# Patient Record
Sex: FEMALE | Race: OTHER | HISPANIC OR LATINO | URBAN - METROPOLITAN AREA
[De-identification: names, ages, dates, MRNs, and addresses within clinical notes are randomized per-mention and may not be internally consistent; named-entity substitution may affect disease eponyms.]

---

## 2017-01-31 ENCOUNTER — EMERGENCY (EMERGENCY)
Facility: HOSPITAL | Age: 67
LOS: 1 days | Discharge: HOME | End: 2017-01-31
Attending: EMERGENCY MEDICINE
Payer: MEDICARE

## 2017-01-31 VITALS
HEART RATE: 85 BPM | TEMPERATURE: 99 F | WEIGHT: 130.07 LBS | RESPIRATION RATE: 18 BRPM | OXYGEN SATURATION: 96 % | DIASTOLIC BLOOD PRESSURE: 80 MMHG | HEIGHT: 60 IN | SYSTOLIC BLOOD PRESSURE: 154 MMHG

## 2017-01-31 VITALS — HEIGHT: 60 IN | WEIGHT: 130.07 LBS
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
## 2017-01-31 DIAGNOSIS — S61.551A OPEN BITE OF RIGHT WRIST, INITIAL ENCOUNTER: ICD-10-CM

## 2017-01-31 DIAGNOSIS — Z23 ENCOUNTER FOR IMMUNIZATION: ICD-10-CM

## 2017-01-31 DIAGNOSIS — E78.5 HYPERLIPIDEMIA, UNSPECIFIED: ICD-10-CM

## 2017-01-31 DIAGNOSIS — I10 ESSENTIAL (PRIMARY) HYPERTENSION: ICD-10-CM
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
## 2017-01-31 PROCEDURE — 12002 RPR S/N/AX/GEN/TRNK2.6-7.5CM: CPT

## 2017-01-31 PROCEDURE — 90471 IMMUNIZATION ADMIN: CPT

## 2017-01-31 PROCEDURE — 99283 EMERGENCY DEPT VISIT LOW MDM: CPT | Mod: 25

## 2017-01-31 PROCEDURE — 90715 TDAP VACCINE 7 YRS/> IM: CPT

## 2017-01-31 RX ORDER — TETANUS TOXOID, REDUCED DIPHTHERIA TOXOID AND ACELLULAR PERTUSSIS VACCINE, ADSORBED 5; 2.5; 8; 8; 2.5 [IU]/.5ML; [IU]/.5ML; UG/.5ML; UG/.5ML; UG/.5ML
0.5 SUSPENSION INTRAMUSCULAR ONCE
Qty: 0 | Refills: 0 | Status: COMPLETED | OUTPATIENT
Start: 2017-01-31 | End: 2017-01-31

## 2017-01-31 RX ADMIN — TETANUS TOXOID, REDUCED DIPHTHERIA TOXOID AND ACELLULAR PERTUSSIS VACCINE, ADSORBED 0.5 MILLILITER(S): 5; 2.5; 8; 8; 2.5 SUSPENSION INTRAMUSCULAR at 14:18

## 2017-01-31 RX ADMIN — Medication 1 TABLET(S): at 14:17

## 2017-01-31 NOTE — ED ADULT NURSE NOTE - OBJECTIVE STATEMENT
Pt presented to ED c/o R FA dog bite , dog UTD with vaccines , injury happens 2 hrs PTA , no bleeding upon arrival

## 2017-01-31 NOTE — ED STATDOCS - ATTENDING CONTRIBUTION TO CARE
I, Enoc Winchester, performed the initial face to face bedside interview with this patient regarding history of present illness, review of symptoms and relevant past medical, social and family history.  I completed an independent physical examination.  I was the initial provider who evaluated this patient. I have signed out the follow up of any pending tests (i.e. labs, radiological studies) to the ACP.  I have communicated the patient’s plan of care and disposition with the ACP.  The history, relevant review of systems, past medical and surgical history, medical decision making, and physical examination was documented by the scribe in my presence and I attest to the accuracy of the documentation.

## 2017-01-31 NOTE — ED STATDOCS - SKIN, MLM
3cm elliptical animal bite to volar aspect of right wrist, deeper at distal aspect. No active bleeding.

## 2017-01-31 NOTE — ED STATDOCS - OBJECTIVE STATEMENT
68 y/o female presents to ED c/o dog bite to right wrist sustained 2 hours ago. Pt states that the injury was inflicted by her daughter's dog, immunizations UTD. Pt reports that she attempted to pet the dog when he bit her. She states that this dog has bitten her family members multiple times in the past and her daughter is bringing the dog to be watched in [place]. PMHx = HTN and HLD. No h/o DM. No further complaints at this time. NKDA. ?if tetanus is UTD. PMD Dr. Garcia.

## 2017-01-31 NOTE — ED STATDOCS - PROGRESS NOTE DETAILS
NP NOTE:  68 y/o F bit by daughters dog today on right wrist.  She has a 3cm elliptical laceration to volar aspect of wrist, full ROM of wrist and hand, sensation intact.  Will rx augmentin 875 x 10 days, f/u with Dr. Wren.   Area cleaned and irrigated thoroughly, sutures placed using sterile technique, return to ED 7 days for suture removal.

## 2017-01-31 NOTE — ED PROCEDURE NOTE - CPROC ED POST PROC CARE GUIDE1
Verbal/written post procedure instructions were given to patient/caregiver./Instructed patient/caregiver to follow-up with primary care physician./Instructed patient/caregiver regarding signs and symptoms of infection./Keep the cast/splint/dressing clean and dry.

## 2017-01-31 NOTE — ED STATDOCS - NS ED MD SCRIBE ATTENDING SCRIBE SECTIONS
REVIEW OF SYSTEMS/PHYSICAL EXAM/PAST MEDICAL/SURGICAL/SOCIAL HISTORY/HISTORY OF PRESENT ILLNESS/DISPOSITION/VITAL SIGNS( Pullset)

## 2017-10-03 ENCOUNTER — RESULT REVIEW (OUTPATIENT)
Age: 67
End: 2017-10-03

## 2018-03-09 ENCOUNTER — INPATIENT (INPATIENT)
Facility: HOSPITAL | Age: 68
LOS: 2 days | Discharge: HOME | DRG: 70 | End: 2018-03-12
Attending: INTERNAL MEDICINE | Admitting: HOSPITALIST
Payer: MEDICARE

## 2018-03-09 VITALS
SYSTOLIC BLOOD PRESSURE: 96 MMHG | RESPIRATION RATE: 18 BRPM | TEMPERATURE: 102 F | WEIGHT: 138.01 LBS | HEART RATE: 103 BPM | DIASTOLIC BLOOD PRESSURE: 64 MMHG | OXYGEN SATURATION: 95 %

## 2018-03-09 DIAGNOSIS — Z98.890 OTHER SPECIFIED POSTPROCEDURAL STATES: Chronic | ICD-10-CM

## 2018-03-09 DIAGNOSIS — A41.9 SEPSIS, UNSPECIFIED ORGANISM: ICD-10-CM

## 2018-03-09 LAB
ALBUMIN SERPL ELPH-MCNC: 3.4 G/DL — SIGNIFICANT CHANGE UP (ref 3.3–5.2)
ALP SERPL-CCNC: 72 U/L — SIGNIFICANT CHANGE UP (ref 40–120)
ALT FLD-CCNC: 21 U/L — SIGNIFICANT CHANGE UP
ANION GAP SERPL CALC-SCNC: 15 MMOL/L — SIGNIFICANT CHANGE UP (ref 5–17)
ANISOCYTOSIS BLD QL: SLIGHT — SIGNIFICANT CHANGE UP
APPEARANCE UR: CLEAR — SIGNIFICANT CHANGE UP
APTT BLD: 24.5 SEC — LOW (ref 27.5–37.4)
AST SERPL-CCNC: 33 U/L — HIGH
BILIRUB SERPL-MCNC: 0.4 MG/DL — SIGNIFICANT CHANGE UP (ref 0.4–2)
BILIRUB UR-MCNC: NEGATIVE — SIGNIFICANT CHANGE UP
BUN SERPL-MCNC: 21 MG/DL — HIGH (ref 8–20)
CALCIUM SERPL-MCNC: 9.2 MG/DL — SIGNIFICANT CHANGE UP (ref 8.6–10.2)
CHLORIDE SERPL-SCNC: 97 MMOL/L — LOW (ref 98–107)
CO2 SERPL-SCNC: 24 MMOL/L — SIGNIFICANT CHANGE UP (ref 22–29)
COLOR SPEC: YELLOW — SIGNIFICANT CHANGE UP
CREAT SERPL-MCNC: 0.76 MG/DL — SIGNIFICANT CHANGE UP (ref 0.5–1.3)
DIFF PNL FLD: NEGATIVE — SIGNIFICANT CHANGE UP
EPI CELLS # UR: SIGNIFICANT CHANGE UP
GLUCOSE SERPL-MCNC: 111 MG/DL — SIGNIFICANT CHANGE UP (ref 70–115)
GLUCOSE UR QL: NEGATIVE MG/DL — SIGNIFICANT CHANGE UP
HCT VFR BLD CALC: 31.6 % — LOW (ref 37–47)
HGB BLD-MCNC: 9.5 G/DL — LOW (ref 12–16)
INR BLD: 1.07 RATIO — SIGNIFICANT CHANGE UP (ref 0.88–1.16)
KETONES UR-MCNC: NEGATIVE — SIGNIFICANT CHANGE UP
LACTATE BLDV-MCNC: 2.4 MMOL/L — HIGH (ref 0.5–2)
LACTATE BLDV-MCNC: 2.5 MMOL/L — HIGH (ref 0.5–2)
LEUKOCYTE ESTERASE UR-ACNC: ABNORMAL
LYMPHOCYTES # BLD AUTO: 5 % — LOW (ref 20–55)
MCHC RBC-ENTMCNC: 25.2 PG — LOW (ref 27–31)
MCHC RBC-ENTMCNC: 30.1 G/DL — LOW (ref 32–36)
MCV RBC AUTO: 83.8 FL — SIGNIFICANT CHANGE UP (ref 81–99)
MONOCYTES NFR BLD AUTO: 2 % — LOW (ref 3–10)
NEUTROPHILS NFR BLD AUTO: 93 % — HIGH (ref 37–73)
NITRITE UR-MCNC: NEGATIVE — SIGNIFICANT CHANGE UP
PH UR: 6 — SIGNIFICANT CHANGE UP (ref 5–8)
PLAT MORPH BLD: NORMAL — SIGNIFICANT CHANGE UP
PLATELET # BLD AUTO: 439 K/UL — HIGH (ref 150–400)
POTASSIUM SERPL-MCNC: 4.2 MMOL/L — SIGNIFICANT CHANGE UP (ref 3.5–5.3)
POTASSIUM SERPL-SCNC: 4.2 MMOL/L — SIGNIFICANT CHANGE UP (ref 3.5–5.3)
PROT SERPL-MCNC: 6.1 G/DL — LOW (ref 6.6–8.7)
PROT UR-MCNC: NEGATIVE MG/DL — SIGNIFICANT CHANGE UP
PROTHROM AB SERPL-ACNC: 11.8 SEC — SIGNIFICANT CHANGE UP (ref 9.8–12.7)
RBC # BLD: 3.77 M/UL — LOW (ref 4.4–5.2)
RBC # FLD: 19.8 % — HIGH (ref 11–15.6)
RBC BLD AUTO: PRESENT — SIGNIFICANT CHANGE UP
RBC CASTS # UR COMP ASSIST: NEGATIVE /HPF — SIGNIFICANT CHANGE UP (ref 0–4)
SODIUM SERPL-SCNC: 136 MMOL/L — SIGNIFICANT CHANGE UP (ref 135–145)
SP GR SPEC: 1.01 — SIGNIFICANT CHANGE UP (ref 1.01–1.02)
UROBILINOGEN FLD QL: NEGATIVE MG/DL — SIGNIFICANT CHANGE UP
WBC # BLD: 17.8 K/UL — HIGH (ref 4.8–10.8)
WBC # FLD AUTO: 17.8 K/UL — HIGH (ref 4.8–10.8)
WBC UR QL: ABNORMAL

## 2018-03-09 PROCEDURE — 93010 ELECTROCARDIOGRAM REPORT: CPT

## 2018-03-09 PROCEDURE — 99291 CRITICAL CARE FIRST HOUR: CPT

## 2018-03-09 PROCEDURE — 99497 ADVNCD CARE PLAN 30 MIN: CPT | Mod: 25

## 2018-03-09 PROCEDURE — 71045 X-RAY EXAM CHEST 1 VIEW: CPT | Mod: 26

## 2018-03-09 PROCEDURE — 99223 1ST HOSP IP/OBS HIGH 75: CPT

## 2018-03-09 RX ORDER — SODIUM CHLORIDE 9 MG/ML
2000 INJECTION INTRAMUSCULAR; INTRAVENOUS; SUBCUTANEOUS ONCE
Qty: 0 | Refills: 0 | Status: COMPLETED | OUTPATIENT
Start: 2018-03-09 | End: 2018-03-09

## 2018-03-09 RX ORDER — SIMVASTATIN 20 MG/1
20 TABLET, FILM COATED ORAL AT BEDTIME
Qty: 0 | Refills: 0 | Status: DISCONTINUED | OUTPATIENT
Start: 2018-03-09 | End: 2018-03-12

## 2018-03-09 RX ORDER — ACETAMINOPHEN 500 MG
650 TABLET ORAL EVERY 6 HOURS
Qty: 0 | Refills: 0 | Status: DISCONTINUED | OUTPATIENT
Start: 2018-03-09 | End: 2018-03-12

## 2018-03-09 RX ORDER — ALBUTEROL 90 UG/1
2.5 AEROSOL, METERED ORAL EVERY 6 HOURS
Qty: 0 | Refills: 0 | Status: DISCONTINUED | OUTPATIENT
Start: 2018-03-09 | End: 2018-03-12

## 2018-03-09 RX ORDER — SODIUM CHLORIDE 9 MG/ML
3 INJECTION INTRAMUSCULAR; INTRAVENOUS; SUBCUTANEOUS ONCE
Qty: 0 | Refills: 0 | Status: COMPLETED | OUTPATIENT
Start: 2018-03-09 | End: 2018-03-09

## 2018-03-09 RX ORDER — VANCOMYCIN HCL 1 G
1000 VIAL (EA) INTRAVENOUS ONCE
Qty: 0 | Refills: 0 | Status: COMPLETED | OUTPATIENT
Start: 2018-03-09 | End: 2018-03-09

## 2018-03-09 RX ORDER — IBUPROFEN 200 MG
600 TABLET ORAL ONCE
Qty: 0 | Refills: 0 | Status: COMPLETED | OUTPATIENT
Start: 2018-03-09 | End: 2018-03-09

## 2018-03-09 RX ORDER — SODIUM CHLORIDE 9 MG/ML
1000 INJECTION, SOLUTION INTRAVENOUS
Qty: 0 | Refills: 0 | Status: DISCONTINUED | OUTPATIENT
Start: 2018-03-09 | End: 2018-03-10

## 2018-03-09 RX ORDER — IPRATROPIUM/ALBUTEROL SULFATE 18-103MCG
3 AEROSOL WITH ADAPTER (GRAM) INHALATION EVERY 6 HOURS
Qty: 0 | Refills: 0 | Status: DISCONTINUED | OUTPATIENT
Start: 2018-03-09 | End: 2018-03-09

## 2018-03-09 RX ORDER — ONDANSETRON 8 MG/1
4 TABLET, FILM COATED ORAL EVERY 6 HOURS
Qty: 0 | Refills: 0 | Status: DISCONTINUED | OUTPATIENT
Start: 2018-03-09 | End: 2018-03-12

## 2018-03-09 RX ORDER — METOPROLOL TARTRATE 50 MG
50 TABLET ORAL DAILY
Qty: 0 | Refills: 0 | Status: DISCONTINUED | OUTPATIENT
Start: 2018-03-09 | End: 2018-03-12

## 2018-03-09 RX ORDER — ACETAMINOPHEN 500 MG
1000 TABLET ORAL ONCE
Qty: 0 | Refills: 0 | Status: DISCONTINUED | OUTPATIENT
Start: 2018-03-09 | End: 2018-03-12

## 2018-03-09 RX ORDER — SIMVASTATIN 20 MG/1
0 TABLET, FILM COATED ORAL
Qty: 0 | Refills: 0 | COMMUNITY

## 2018-03-09 RX ORDER — LORATADINE 10 MG/1
10 TABLET ORAL DAILY
Qty: 0 | Refills: 0 | Status: DISCONTINUED | OUTPATIENT
Start: 2018-03-09 | End: 2018-03-12

## 2018-03-09 RX ORDER — CEFEPIME 1 G/1
2000 INJECTION, POWDER, FOR SOLUTION INTRAMUSCULAR; INTRAVENOUS ONCE
Qty: 0 | Refills: 0 | Status: COMPLETED | OUTPATIENT
Start: 2018-03-09 | End: 2018-03-09

## 2018-03-09 RX ORDER — AMLODIPINE BESYLATE 2.5 MG/1
5 TABLET ORAL DAILY
Qty: 0 | Refills: 0 | Status: DISCONTINUED | OUTPATIENT
Start: 2018-03-09 | End: 2018-03-12

## 2018-03-09 RX ORDER — SODIUM CHLORIDE 9 MG/ML
2000 INJECTION, SOLUTION INTRAVENOUS ONCE
Qty: 0 | Refills: 0 | Status: COMPLETED | OUTPATIENT
Start: 2018-03-09 | End: 2018-03-09

## 2018-03-09 RX ADMIN — Medication 250 MILLIGRAM(S): at 20:27

## 2018-03-09 RX ADMIN — SODIUM CHLORIDE 2000 MILLILITER(S): 9 INJECTION, SOLUTION INTRAVENOUS at 20:15

## 2018-03-09 RX ADMIN — SODIUM CHLORIDE 3 MILLILITER(S): 9 INJECTION INTRAMUSCULAR; INTRAVENOUS; SUBCUTANEOUS at 20:46

## 2018-03-09 RX ADMIN — Medication 600 MILLIGRAM(S): at 20:46

## 2018-03-09 RX ADMIN — SODIUM CHLORIDE 2000 MILLILITER(S): 9 INJECTION INTRAMUSCULAR; INTRAVENOUS; SUBCUTANEOUS at 23:10

## 2018-03-09 NOTE — ED PROVIDER NOTE - CARE PLAN
Principal Discharge DX:	Sepsis  Secondary Diagnosis:	Breast cancer  Secondary Diagnosis:	H/O mastectomy, bilateral

## 2018-03-09 NOTE — H&P ADULT - HISTORY OF PRESENT ILLNESS
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 Pt now back to baseline and states that yesterday she began to feel ill after work, noting that her face was flushed (states this happens with chemo). Pt states she always has a cough, and that it is at baseline. Pt denies n/v/d, dysuria, hematuria, HA, palpitations, chest pain. No sick contacts at home however + at school.

## 2018-03-09 NOTE — ED PROVIDER NOTE - MEDICAL DECISION MAKING DETAILS
69 y/o F, with breast cancer, s/p chemotherapy 2 days ago, presents with fever of unknown source,  will do blood test, IV fluids and abx, and reeval

## 2018-03-09 NOTE — H&P ADULT - ATTENDING COMMENTS
30 min time spent discussing advanced care planning including code status, existence of health care proxy, plan of treatment, consultants if called, and prognosis. Family and pt in agreement with above, all questions answered and concerns addressed.

## 2018-03-09 NOTE — H&P ADULT - PROBLEM SELECTOR PLAN 1
concerning for infectious or neurological etiology  Urinalysis and culture  Blood cultures in setting of infection  Fall precautions  RVP  legionella antigen  nebs prn  tylenol prn fever/pain  sputum culture  lactate 2.4 on presentation, s/p 2L up to 2.5, additional IVF being given now and will repeat   Procalcitonin 0.06  CT chest/head as pt p/w confusion and weakness however s/s more consistent with infection given presentation without focal neuro deficits and returned to baseline s/p IVF and abx. CT chest to evaluate for pneumonia as pt with mets to lungs and bone and unable to truly differentiate on chest xray. concerning for infectious or neurological etiology  Urinalysis and culture  Blood cultures in setting of infection  Fall precautions  RVP  legionella antigen  nebs prn  tylenol prn fever/pain  sputum culture  lactate 2.4 on presentation, s/p 2L up to 2.5, additional IVF being given now and will repeat   Procalcitonin 0.06  CT chest/head as pt p/w confusion and weakness however s/s more consistent with infection given presentation without focal neuro deficits and returned to baseline s/p IVF and abx. CT chest to evaluate for pneumonia and CT head ordered as pt with mets to lungs and bone and unable to truly differentiate pneumonia vs mets on chest xray and possibility of brain mets.

## 2018-03-09 NOTE — ED PROVIDER NOTE - OBJECTIVE STATEMENT
69 y/o F, with hx of HTN, breast cancer, and a double mastectomy, presents to the ED c/o fever, onset today.  Pt states that she had a fever of 103F this morning and self medicated with Tylenol.  After taking medicine her fever dropped to 101F.  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  Pt has been receiving chemotherapy since October.  Last chemo session was 2 days ago.  Denies body aches, sore throat, cough, rash, or burning urination.  Pt also states that earlier today she felt dizzy.  States she was diagnosed with breast cancer 3 years ago, but recently it spread to her lungs, liver, part of her kidney, and she had a lesion on her ribs.  On last PET scan, pt was told that 95% of her lesions were no longer visible.  Denies CP, SOB, N/V/D, or back pain.  Oncologist: Dr. Amparo Gill. Code sepsis activated.

## 2018-03-09 NOTE — H&P ADULT - PROBLEM SELECTOR PROBLEM 5
Bilateral malignant neoplasm of breast in female, unspecified estrogen receptor status, unspecified site of breast

## 2018-03-09 NOTE — ED ADULT TRIAGE NOTE - CHIEF COMPLAINT QUOTE
patient jackelyn from home with a fever, had chemo wednesday has not been eating or drinking anything since, patient also has complaints of feeling weak. patient states that she has some difficulty breathing. patient last took tylenol approx 45 minutes prior to arrival to ED

## 2018-03-09 NOTE — H&P ADULT - FAMILY HISTORY
Mother  Still living? Unknown  Maternal family history of cancer, Age at diagnosis: Age Unknown     Sibling  Still living? Unknown  Maternal family history of cancer, Age at diagnosis: Age Unknown

## 2018-03-09 NOTE — H&P ADULT - ASSESSMENT
67 yo female with active metastatic breast cancer presents with confusion, weakness, and fever meeting SIRS criteria and concerning for sepsis however no identifiable source found at this time. CT chest pending.

## 2018-03-10 ENCOUNTER — TRANSCRIPTION ENCOUNTER (OUTPATIENT)
Age: 68
End: 2018-03-10

## 2018-03-10 DIAGNOSIS — Z98.82 BREAST IMPLANT STATUS: Chronic | ICD-10-CM

## 2018-03-10 DIAGNOSIS — A41.9 SEPSIS, UNSPECIFIED ORGANISM: ICD-10-CM

## 2018-03-10 DIAGNOSIS — D63.0 ANEMIA IN NEOPLASTIC DISEASE: ICD-10-CM

## 2018-03-10 DIAGNOSIS — G93.41 METABOLIC ENCEPHALOPATHY: ICD-10-CM

## 2018-03-10 DIAGNOSIS — Z79.899 OTHER LONG TERM (CURRENT) DRUG THERAPY: ICD-10-CM

## 2018-03-10 DIAGNOSIS — N17.9 ACUTE KIDNEY FAILURE, UNSPECIFIED: ICD-10-CM

## 2018-03-10 DIAGNOSIS — I10 ESSENTIAL (PRIMARY) HYPERTENSION: ICD-10-CM

## 2018-03-10 DIAGNOSIS — C50.911 MALIGNANT NEOPLASM OF UNSPECIFIED SITE OF RIGHT FEMALE BREAST: ICD-10-CM

## 2018-03-10 DIAGNOSIS — E78.5 HYPERLIPIDEMIA, UNSPECIFIED: ICD-10-CM

## 2018-03-10 LAB
ALBUMIN SERPL ELPH-MCNC: 3 G/DL — LOW (ref 3.3–5.2)
ALP SERPL-CCNC: 66 U/L — SIGNIFICANT CHANGE UP (ref 40–120)
ALT FLD-CCNC: 16 U/L — SIGNIFICANT CHANGE UP
ANION GAP SERPL CALC-SCNC: 14 MMOL/L — SIGNIFICANT CHANGE UP (ref 5–17)
ANISOCYTOSIS BLD QL: SLIGHT — SIGNIFICANT CHANGE UP
APTT BLD: 23.6 SEC — LOW (ref 27.5–37.4)
AST SERPL-CCNC: 23 U/L — SIGNIFICANT CHANGE UP
BASOPHILS # BLD AUTO: 0 K/UL — SIGNIFICANT CHANGE UP (ref 0–0.2)
BASOPHILS NFR BLD AUTO: 0.1 % — SIGNIFICANT CHANGE UP (ref 0–2)
BILIRUB SERPL-MCNC: 0.5 MG/DL — SIGNIFICANT CHANGE UP (ref 0.4–2)
BUN SERPL-MCNC: 15 MG/DL — SIGNIFICANT CHANGE UP (ref 8–20)
CALCIUM SERPL-MCNC: 8.7 MG/DL — SIGNIFICANT CHANGE UP (ref 8.6–10.2)
CHLORIDE SERPL-SCNC: 104 MMOL/L — SIGNIFICANT CHANGE UP (ref 98–107)
CHOLEST SERPL-MCNC: 144 MG/DL — SIGNIFICANT CHANGE UP (ref 110–199)
CO2 SERPL-SCNC: 21 MMOL/L — LOW (ref 22–29)
CREAT SERPL-MCNC: 0.55 MG/DL — SIGNIFICANT CHANGE UP (ref 0.5–1.3)
EOSINOPHIL # BLD AUTO: 0 K/UL — SIGNIFICANT CHANGE UP (ref 0–0.5)
EOSINOPHIL NFR BLD AUTO: 0.1 % — SIGNIFICANT CHANGE UP (ref 0–5)
ESTIMATED AVERAGE GLUCOSE: 108 MG/DL — SIGNIFICANT CHANGE UP (ref 68–114)
GLUCOSE SERPL-MCNC: 140 MG/DL — HIGH (ref 70–115)
HBA1C BLD-MCNC: 5.4 % — SIGNIFICANT CHANGE UP (ref 4–5.6)
HBA1C BLD-MCNC: 5.5 % — SIGNIFICANT CHANGE UP (ref 4–5.6)
HCT VFR BLD CALC: 28.9 % — LOW (ref 37–47)
HDLC SERPL-MCNC: 34 MG/DL — LOW
HGB BLD-MCNC: 8.5 G/DL — LOW (ref 12–16)
HYPOCHROMIA BLD QL: SLIGHT — SIGNIFICANT CHANGE UP
INR BLD: 1.09 RATIO — SIGNIFICANT CHANGE UP (ref 0.88–1.16)
LACTATE BLDV-MCNC: 2.1 MMOL/L — HIGH (ref 0.5–2)
LIPID PNL WITH DIRECT LDL SERPL: 65 MG/DL — SIGNIFICANT CHANGE UP
LYMPHOCYTES # BLD AUTO: 0.3 K/UL — LOW (ref 1–4.8)
LYMPHOCYTES # BLD AUTO: 1.7 % — LOW (ref 20–55)
MACROCYTES BLD QL: SLIGHT — SIGNIFICANT CHANGE UP
MAGNESIUM SERPL-MCNC: 1.8 MG/DL — SIGNIFICANT CHANGE UP (ref 1.6–2.6)
MCHC RBC-ENTMCNC: 24.4 PG — LOW (ref 27–31)
MCHC RBC-ENTMCNC: 29.4 G/DL — LOW (ref 32–36)
MCV RBC AUTO: 83 FL — SIGNIFICANT CHANGE UP (ref 81–99)
MICROCYTES BLD QL: SLIGHT — SIGNIFICANT CHANGE UP
MONOCYTES # BLD AUTO: 0.1 K/UL — SIGNIFICANT CHANGE UP (ref 0–0.8)
MONOCYTES NFR BLD AUTO: 0.4 % — LOW (ref 3–10)
NEUTROPHILS # BLD AUTO: 19.5 K/UL — HIGH (ref 1.8–8)
NEUTROPHILS NFR BLD AUTO: 97.3 % — HIGH (ref 37–73)
PHOSPHATE SERPL-MCNC: 2.5 MG/DL — SIGNIFICANT CHANGE UP (ref 2.4–4.7)
PLAT MORPH BLD: ABNORMAL
PLATELET # BLD AUTO: 368 K/UL — SIGNIFICANT CHANGE UP (ref 150–400)
PLATELET CLUMP BLD QL SMEAR: SIGNIFICANT CHANGE UP
POTASSIUM SERPL-MCNC: 4.1 MMOL/L — SIGNIFICANT CHANGE UP (ref 3.5–5.3)
POTASSIUM SERPL-SCNC: 4.1 MMOL/L — SIGNIFICANT CHANGE UP (ref 3.5–5.3)
PROCALCITONIN SERPL-MCNC: 0.06 NG/ML — HIGH (ref 0–0.04)
PROT SERPL-MCNC: 5.6 G/DL — LOW (ref 6.6–8.7)
PROTHROM AB SERPL-ACNC: 12 SEC — SIGNIFICANT CHANGE UP (ref 9.8–12.7)
RAPID RVP RESULT: SIGNIFICANT CHANGE UP
RBC # BLD: 3.48 M/UL — LOW (ref 4.4–5.2)
RBC # FLD: 19.4 % — HIGH (ref 11–15.6)
RBC BLD AUTO: ABNORMAL
SODIUM SERPL-SCNC: 139 MMOL/L — SIGNIFICANT CHANGE UP (ref 135–145)
TOTAL CHOLESTEROL/HDL RATIO MEASUREMENT: 4 RATIO — SIGNIFICANT CHANGE UP (ref 3.3–7.1)
TRIGL SERPL-MCNC: 227 MG/DL — HIGH (ref 10–200)
WBC # BLD: 20 K/UL — HIGH (ref 4.8–10.8)
WBC # FLD AUTO: 20 K/UL — HIGH (ref 4.8–10.8)

## 2018-03-10 PROCEDURE — 99223 1ST HOSP IP/OBS HIGH 75: CPT

## 2018-03-10 PROCEDURE — 71250 CT THORAX DX C-: CPT | Mod: 26

## 2018-03-10 PROCEDURE — 12345: CPT | Mod: NC

## 2018-03-10 PROCEDURE — 99222 1ST HOSP IP/OBS MODERATE 55: CPT

## 2018-03-10 PROCEDURE — 70450 CT HEAD/BRAIN W/O DYE: CPT | Mod: 26

## 2018-03-10 PROCEDURE — 70553 MRI BRAIN STEM W/O & W/DYE: CPT | Mod: 26

## 2018-03-10 RX ORDER — PANTOPRAZOLE SODIUM 20 MG/1
40 TABLET, DELAYED RELEASE ORAL
Qty: 0 | Refills: 0 | Status: DISCONTINUED | OUTPATIENT
Start: 2018-03-10 | End: 2018-03-12

## 2018-03-10 RX ORDER — DEXAMETHASONE 0.5 MG/5ML
10 ELIXIR ORAL ONCE
Qty: 0 | Refills: 0 | Status: COMPLETED | OUTPATIENT
Start: 2018-03-10 | End: 2018-03-10

## 2018-03-10 RX ORDER — LEVETIRACETAM 250 MG/1
500 TABLET, FILM COATED ORAL
Qty: 0 | Refills: 0 | Status: DISCONTINUED | OUTPATIENT
Start: 2018-03-10 | End: 2018-03-12

## 2018-03-10 RX ORDER — PIPERACILLIN AND TAZOBACTAM 4; .5 G/20ML; G/20ML
3.38 INJECTION, POWDER, LYOPHILIZED, FOR SOLUTION INTRAVENOUS EVERY 8 HOURS
Qty: 0 | Refills: 0 | Status: DISCONTINUED | OUTPATIENT
Start: 2018-03-10 | End: 2018-03-10

## 2018-03-10 RX ORDER — SODIUM CHLORIDE 9 MG/ML
1000 INJECTION, SOLUTION INTRAVENOUS
Qty: 0 | Refills: 0 | Status: DISCONTINUED | OUTPATIENT
Start: 2018-03-10 | End: 2018-03-11

## 2018-03-10 RX ORDER — DEXAMETHASONE 0.5 MG/5ML
4 ELIXIR ORAL EVERY 6 HOURS
Qty: 0 | Refills: 0 | Status: DISCONTINUED | OUTPATIENT
Start: 2018-03-10 | End: 2018-03-11

## 2018-03-10 RX ORDER — VANCOMYCIN HCL 1 G
1000 VIAL (EA) INTRAVENOUS EVERY 12 HOURS
Qty: 0 | Refills: 0 | Status: DISCONTINUED | OUTPATIENT
Start: 2018-03-10 | End: 2018-03-10

## 2018-03-10 RX ORDER — SODIUM CHLORIDE 9 MG/ML
500 INJECTION, SOLUTION INTRAVENOUS ONCE
Qty: 0 | Refills: 0 | Status: COMPLETED | OUTPATIENT
Start: 2018-03-10 | End: 2018-03-10

## 2018-03-10 RX ORDER — CEFEPIME 1 G/1
2000 INJECTION, POWDER, FOR SOLUTION INTRAMUSCULAR; INTRAVENOUS EVERY 8 HOURS
Qty: 0 | Refills: 0 | Status: DISCONTINUED | OUTPATIENT
Start: 2018-03-10 | End: 2018-03-12

## 2018-03-10 RX ORDER — ATORVASTATIN CALCIUM 80 MG/1
40 TABLET, FILM COATED ORAL AT BEDTIME
Qty: 0 | Refills: 0 | Status: DISCONTINUED | OUTPATIENT
Start: 2018-03-10 | End: 2018-03-10

## 2018-03-10 RX ADMIN — LORATADINE 10 MILLIGRAM(S): 10 TABLET ORAL at 11:47

## 2018-03-10 RX ADMIN — SODIUM CHLORIDE 1000 MILLILITER(S): 9 INJECTION, SOLUTION INTRAVENOUS at 03:48

## 2018-03-10 RX ADMIN — SODIUM CHLORIDE 125 MILLILITER(S): 9 INJECTION, SOLUTION INTRAVENOUS at 03:33

## 2018-03-10 RX ADMIN — SODIUM CHLORIDE 125 MILLILITER(S): 9 INJECTION, SOLUTION INTRAVENOUS at 11:47

## 2018-03-10 RX ADMIN — LEVETIRACETAM 500 MILLIGRAM(S): 250 TABLET, FILM COATED ORAL at 17:54

## 2018-03-10 RX ADMIN — SODIUM CHLORIDE 125 MILLILITER(S): 9 INJECTION, SOLUTION INTRAVENOUS at 07:44

## 2018-03-10 RX ADMIN — Medication 50 MILLIGRAM(S): at 07:43

## 2018-03-10 RX ADMIN — LEVETIRACETAM 500 MILLIGRAM(S): 250 TABLET, FILM COATED ORAL at 07:43

## 2018-03-10 RX ADMIN — SODIUM CHLORIDE 75 MILLILITER(S): 9 INJECTION, SOLUTION INTRAVENOUS at 16:54

## 2018-03-10 RX ADMIN — Medication 4 MILLIGRAM(S): at 23:15

## 2018-03-10 RX ADMIN — CEFEPIME 100 MILLIGRAM(S): 1 INJECTION, POWDER, FOR SOLUTION INTRAMUSCULAR; INTRAVENOUS at 15:03

## 2018-03-10 RX ADMIN — CEFEPIME 100 MILLIGRAM(S): 1 INJECTION, POWDER, FOR SOLUTION INTRAMUSCULAR; INTRAVENOUS at 23:14

## 2018-03-10 RX ADMIN — ALBUTEROL 2.5 MILLIGRAM(S): 90 AEROSOL, METERED ORAL at 03:47

## 2018-03-10 RX ADMIN — CEFEPIME 100 MILLIGRAM(S): 1 INJECTION, POWDER, FOR SOLUTION INTRAMUSCULAR; INTRAVENOUS at 01:19

## 2018-03-10 RX ADMIN — SODIUM CHLORIDE 75 MILLILITER(S): 9 INJECTION, SOLUTION INTRAVENOUS at 23:14

## 2018-03-10 RX ADMIN — Medication 4 MILLIGRAM(S): at 07:43

## 2018-03-10 RX ADMIN — Medication 4 MILLIGRAM(S): at 17:53

## 2018-03-10 RX ADMIN — AMLODIPINE BESYLATE 5 MILLIGRAM(S): 2.5 TABLET ORAL at 07:43

## 2018-03-10 RX ADMIN — Medication 4 MILLIGRAM(S): at 11:48

## 2018-03-10 RX ADMIN — SIMVASTATIN 20 MILLIGRAM(S): 20 TABLET, FILM COATED ORAL at 23:14

## 2018-03-10 RX ADMIN — Medication 10 MILLIGRAM(S): at 03:33

## 2018-03-10 RX ADMIN — PIPERACILLIN AND TAZOBACTAM 25 GRAM(S): 4; .5 INJECTION, POWDER, LYOPHILIZED, FOR SOLUTION INTRAVENOUS at 07:43

## 2018-03-10 NOTE — DISCHARGE NOTE ADULT - HOSPITAL COURSE
Vital Signs Last 24 Hrs  T(C): 36.8 (12 Mar 2018 08:18), Max: 37.1 (11 Mar 2018 16:00)  T(F): 98.3 (12 Mar 2018 08:18), Max: 98.8 (11 Mar 2018 20:00)  HR: 83 (12 Mar 2018 08:00) (73 - 93)  BP: 141/73 (12 Mar 2018 08:00) (123/63 - 150/81)  BP(mean): 81 (11 Mar 2018 16:30) (81 - 89)  RR: 20 (12 Mar 2018 08:00) (15 - 22)  SpO2: 98% (12 Mar 2018 08:00) (95% - 98%)  GEN: NAD, pleasant  HEENT: normocephalic and atraumatic. EOMI. PERRL.    NECK: Supple.   LUNGS: Clear to auscultation.  HEART: Regular rate and rhythm   ABDOMEN: Soft, nontender, and nondistended.  Positive bowel sounds.    : No CVA tenderness  EXTREMITIES: Without any edema.  MSK: No joint swelling  NEUROLOGIC: Cranial nerves II through XII are grossly intact.  PSYCHIATRIC: Appropriate affect .  SKIN: No rash    Pt is a 69 yo female with a pmh/o HTN, HLD, Breast CA first diagnosed in 2014 and s/p chemo, who went into remission 2015 and now in 2017 was rediagnosed and now with mets to lung, liver, bone, and kidney. admitted with confusion with fevers, stroke ruled out. empiric antibiotics given until c/s came back negative. brain mets with cerebral edema by MRI. on decadron taper.  Medically stable and agreeable with discharge and follow up plan. Patient was advised to return to ED if any symptoms occur or worsen.  time 45 mins

## 2018-03-10 NOTE — DISCHARGE NOTE ADULT - MEDICATION SUMMARY - MEDICATIONS TO TAKE
I will START or STAY ON the medications listed below when I get home from the hospital:    dexamethasone 4 mg oral tablet  -- 1 tab(s) by mouth 4 times a day x 1 day  1 tab by mouth 3 times a  day x 2 days  1 tab by mouth 2 times a day x 2 days  1 tab by mouth daily x 2 days  -- Indication: For Cerebral edema    aspirin 81 mg oral tablet  -- 1 tab(s) by mouth once a day  -- Indication: For Essential hypertension    levETIRAcetam 500 mg oral tablet  -- 1 tab(s) by mouth 2 times a day  -- Indication: For Brain mets    ZyrTEC 10 mg oral tablet  -- 1 tab(s) by mouth once a day  -- Indication: For Allergies    simvastatin 20 mg oral tablet  -- 1 tab(s) by mouth once a day (at bedtime)  -- Indication: For Hyperlipidemia, unspecified hyperlipidemia type    metoprolol succinate 50 mg oral tablet, extended release  -- 1 tab(s) by mouth once a day  -- Indication: For HTN (hypertension)    amLODIPine 5 mg oral tablet  -- 1 tab(s) by mouth once a day  -- Indication: For HTN (hypertension)    pantoprazole 40 mg oral delayed release tablet  -- 1 tab(s) by mouth once a day (before a meal)  -- Indication: For on steroids

## 2018-03-10 NOTE — CONSULT NOTE ADULT - SUBJECTIVE AND OBJECTIVE BOX
[facility]                                        [facility] [place]                                  Michael Cruz, & [place]                                      [address]                                           [place], [place], [address]                                                [phone]    HISTORY:    The patient is a 68y Female with breast cancer with recurrence in 2017.   She underwent chemotherapy last week and presented yesterday with fever, weakness, and difficulty walking.   CT head was noted to be abnormal and I was called.     PAST MEDICAL & SURGICAL HISTORY:  Hyperlipidemia, unspecified hyperlipidemia type  Breast cancer: Mestatiszied to lung/brain  HTN (hypertension)  History of breast augmentation   delivery delivered  H/O mastectomy, bilateral      MEDICATIONS  (STANDING):  acetaminophen  IVPB 1000 milliGRAM(s) IV Intermittent once  amLODIPine   Tablet 5 milliGRAM(s) Oral daily  cefepime  IVPB 2000 milliGRAM(s) IV Intermittent every 8 hours  dexamethasone     Tablet 4 milliGRAM(s) Oral every 6 hours  lactated ringers. 1000 milliLiter(s) (125 mL/Hr) IV Continuous <Continuous>  levETIRAcetam 500 milliGRAM(s) Oral two times a day  loratadine 10 milliGRAM(s) Oral daily  metoprolol succinate ER 50 milliGRAM(s) Oral daily  pantoprazole    Tablet 40 milliGRAM(s) Oral before breakfast  simvastatin 20 milliGRAM(s) Oral at bedtime    MEDICATIONS  (PRN):  acetaminophen   Tablet 650 milliGRAM(s) Oral every 6 hours PRN For Temp greater than 38 C (100.4 F)  acetaminophen   Tablet. 650 milliGRAM(s) Oral every 6 hours PRN Moderate Pain (4 - 6)  ALBUTerol    0.083% 2.5 milliGRAM(s) Nebulizer every 6 hours PRN Shortness of Breath and/or Wheezing  ondansetron Injectable 4 milliGRAM(s) IV Push every 6 hours PRN Nausea      Allergies  No Known Allergies    SOCIAL HISTORY:  Former smoker.     FAMILY HISTORY:  Maternal family history of cancer (Mother, Sibling): mother rectal ca  sister fallopian tube ca      ROS:  The patient denies fevers or weight changes.  Denies headache.  Denies chest pain.  Denies shortness of breath.  Denies abdominal pain, nausea, or vomiting.  Denies change in urinary pattern.  Denies rash.  Denies recent mood changes.    Exam:  Vital Signs Last 24 Hrs  T(F): 98.4 (10 Mar 2018 11:46), Max: 101.6 (09 Mar 2018 20:11)  HR: 90 (10 Mar 2018 11:46) (85 - 103)  BP: 127/70 (10 Mar 2018 11:46) (96/64 - 137/74)  RR: 18 (10 Mar 2018 11:46) (16 - 18)  SpO2: 97% (10 Mar 2018 11:46) (94% - 97%)  General: NAD.   Carotid bruits absent.     Mental status: The patient is awake, alert, and fully oriented. There is no aphasia.    Cranial nerves: There is no papilledema. Pupils react Symmetrically to light. There is no visual field deficit to confrontation. Extraocular motion is full with no nystagmus. There is no ptosis. Facial sensation is intact. Facial musculature is symmetric. Palate elevates symmetrically. Tongue is midline.    Motor: There is normal bulk and tone.  Strength is 5/5 in the right arm and leg.   Strength is 5/5 in the left arm and leg.    Sensation: Intact to light touch and pin.    Reflexes: 2+ throughout and plantar responses are flexor.    Cerebellar: There is no dysmetria on finger to nose testing.    LABS:                         8.5    20.0  )-----------( 368               28.9       03-10    139  |  104  |  15.0  ----------------------------<  140<H>  4.1   |  21.0<L>  |  0.55    Ca    8.7        Phos  2.5     Mg     1.8      TPro  5.6<L>  /  Alb  3.0<L>  /  TBili  0.5  /  DBili  x   /  AST  23  /  ALT  16  /  AlkPhos  66  03-10      PT/INR - ( 10 Mar 2018 07:16 )   PT: 12.0 sec;   INR: 1.09 ratio    PTT - ( 10 Mar 2018 07:16 )  PTT:23.6 sec    RADIOLOGY & ADDITIONAL STUDIES:  CT head shows left posterior parietal/occipital low density with mass effect suspicious for mass lesion.

## 2018-03-10 NOTE — ED ADULT NURSE REASSESSMENT NOTE - NS ED NURSE REASSESS COMMENT FT1
pt. has been out of bed, ambulating without difficultly. pt. has been out of bed, ambulating without difficultly. NSR on monitor.

## 2018-03-10 NOTE — ED ADULT NURSE REASSESSMENT NOTE - NS ED NURSE REASSESS COMMENT FT1
pt. received from night RN. pt. a&ox3. pt. denies pain or discomfort at this time. vss. as documented. pt. informed on plan of care. medications given as documented. awaiting bed. will continue to monitor.

## 2018-03-10 NOTE — DISCHARGE NOTE ADULT - SECONDARY DIAGNOSIS.
Anemia in neoplastic disease Bilateral malignant neoplasm of breast in female, unspecified estrogen receptor status, unspecified site of breast Essential hypertension HTN (hypertension) SIRS due to infectious process without acute organ dysfunction

## 2018-03-10 NOTE — CONSULT NOTE ADULT - SUBJECTIVE AND OBJECTIVE BOX
HPI: Patient is a 68y Female seen on consultation for the evaluation and management of metastatic breast cancer.  She was diagnosed with Triple negative breast cancer in , treated with bilateral Mx/reconstruction 10/27/14, followed by dose dense AC-T (Stage IIA).  Found to have recurrence, metastatic disease to liver , lung, bone, treated with Taxotere, Gemzar, last treatment 3/7/18;  PET scan 18 showed improvement, but new bony lesion T5.  Developed fever to 103 last night with reported confusion, weakness, vomiting.  CT chest showed innumerable pulmonary nodules; CT brain showed left medial post parietal/occipital lobe lesion with surrounding vasogenic edema.  Also noted calvarial lucency.  Seen in ER, awake, alert, comfortable.  Denies SOB, chest pain, headaches or dizziness.  No further nausea; denies bone pain.      PAST MEDICAL & SURGICAL HISTORY:  Hyperlipidemia, unspecified hyperlipidemia type  Breast cancer: Mestatiszied to lung/brain  HTN (hypertension)  History of breast augmentation   delivery delivered  H/O mastectomy, bilateral      REVIEW OF SYSTEMS      General:no fatigue; appetite fair.  Felt ill with high fever	    Skin/Breast:reconstructive surgery  	  Ophthalmologic:Denies blurry vision or diplopia  	  ENMT:	denies sore throat, dysphagia, odynophagia    Respiratory and Thorax:denies SOB or cough  	  Cardiovascular:	no chest pain or palpitations    Gastrointestinal:	no abdominal pain    Genitourinary:	denied dysuria    Musculoskeletal:	no bone pain    Neurological:	denies weakness, seizure activity        Hematology/Lymphatics:	deneis bleeding        	    MEDICATIONS  (STANDING):  acetaminophen  IVPB 1000 milliGRAM(s) IV Intermittent once  amLODIPine   Tablet 5 milliGRAM(s) Oral daily  cefepime  IVPB 2000 milliGRAM(s) IV Intermittent every 8 hours  dexamethasone     Tablet 4 milliGRAM(s) Oral every 6 hours  levETIRAcetam 500 milliGRAM(s) Oral two times a day  loratadine 10 milliGRAM(s) Oral daily  metoprolol succinate ER 50 milliGRAM(s) Oral daily  pantoprazole    Tablet 40 milliGRAM(s) Oral before breakfast  simvastatin 20 milliGRAM(s) Oral at bedtime    MEDICATIONS  (PRN):  acetaminophen   Tablet 650 milliGRAM(s) Oral every 6 hours PRN For Temp greater than 38 C (100.4 F)  acetaminophen   Tablet. 650 milliGRAM(s) Oral every 6 hours PRN Moderate Pain (4 - 6)  ALBUTerol    0.083% 2.5 milliGRAM(s) Nebulizer every 6 hours PRN Shortness of Breath and/or Wheezing  ondansetron Injectable 4 milliGRAM(s) IV Push every 6 hours PRN Nausea      Allergies    No Known Allergies    Intolerances        SOCIAL HISTORY:    Smoking Status:No current smoking  Alcohol:Denied    Occupation:Works in school    FAMILY HISTORY:  Maternal family history of cancer (Mother, Sibling): mother rectal ca  sister fallopian tube ca              	        Vital Signs Last 24 Hrs  T(C): 36.9 (10 Mar 2018 11:46), Max: 38.7 (09 Mar 2018 20:11)  T(F): 98.4 (10 Mar 2018 11:46), Max: 101.6 (09 Mar 2018 20:11)  HR: 90 (10 Mar 2018 11:46) (85 - 103)  BP: 127/70 (10 Mar 2018 11:46) (96/64 - 137/74)  BP(mean): --  RR: 18 (10 Mar 2018 11:46) (16 - 18)  SpO2: 97% (10 Mar 2018 11:46) (94% - 97%)    PHYSICAL EXAM:      Constitutional:Awake, alert, comfortable    Eyes:anicteric    ENMT:moist mm's, no lesion    Neck:supple    Breasts:NE (in ER)      Respiratory:Clear    Cardiovascular:RRR, zach S1S2    Gastrointestinal:Soft, non-distended, non-tender    Genitourinary:NE    Rectal:NE    Extremities:No cyanosis or edema      Neurological:Non-focal    Skin:no rash                  LABS:                        8.5    20.0  )-----------( 368      ( 10 Mar 2018 07:16 )             28.9     03-10    139  |  104  |  15.0  ----------------------------<  140<H>  4.1   |  21.0<L>  |  0.55    Ca    8.7      10 Mar 2018 07:16  Phos  2.5     03-10  Mg     1.8     03-10    TPro  5.6<L>  /  Alb  3.0<L>  /  TBili  0.5  /  DBili  x   /  AST  23  /  ALT  16  /  AlkPhos  66  03-10    PT/INR - ( 10 Mar 2018 07:16 )   PT: 12.0 sec;   INR: 1.09 ratio         PTT - ( 10 Mar 2018 07:16 )  PTT:23.6 sec  Urinalysis Basic - ( 09 Mar 2018 21:54 )    Color: Yellow / Appearance: Clear / S.010 / pH: x  Gluc: x / Ketone: Negative  / Bili: Negative / Urobili: Negative mg/dL   Blood: x / Protein: Negative mg/dL / Nitrite: Negative   Leuk Esterase: Moderate / RBC: Negative /HPF / WBC 6-10   Sq Epi: x / Non Sq Epi: Occasional / Bacteria: x        RADIOLOGY & ADDITIONAL STUDIES:

## 2018-03-10 NOTE — CONSULT NOTE ADULT - ASSESSMENT
7y/o  Female h/o HTN, HLD, Breast CA first diagnosed in 2014 and s/p chemo, who went into remission 2015 and now in 2017 was re-diagnosed and now with mets to lung, liver, bone, and kidney. Patient started Chemotherapy in October 2017 and last chemotherapy was last week Wednesday. Here with Fever and chills x 1day

## 2018-03-10 NOTE — CONSULT NOTE ADULT - SUBJECTIVE AND OBJECTIVE BOX
[facility]  INFECTIOUS DISEASES AND INTERNAL MEDICINE at [place]  =======================================================  Zechariah Yung MD  Diplomates American Board of Internal Medicine and Infectious Diseases  =======================================================      [id number]  DARA JACKSON     CC: Patient is a 68y old  Female who presents with a chief complaint of fever, weakness, confusion (09 Mar 2018 22:22)    67y/o  Female h/o HTN, HLD, Breast CA first diagnosed in  and s/p chemo, who went into remission  and now in  was re-diagnosed and now with mets to lung, liver, bone, and kidney. Patient started Chemotherapy in 2017 and last chemotherapy was last week Wednesday. Patient reports she nromally feels lethargic after chemotherapy which she did on Wednesday after chemotherapy. On Thursday she was well and went to work and had a normal appetite and was full of energy. Thursday evening she felt lethargic went to sleep and Friday morning felt worse and was not able to go to work. Her family noted her to have a fever at home of 103.1 the highest, associated with lethargy, decreased mentation, poor apetite so her daughter called EMS. Patient recalls all of the events of Friday, and can recall the questions she had difficulty answering. In the ER patient was febrile to 101.6, leukocytosis to 20k. She was started on IV Vancomycin and Zosyn. ID input requested.       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  No earache, sore throat or runny nose.  CARDIOVASCULAR:  No pressure, squeezing, strangling, tightness, heaviness or aching about the chest, neck, axilla or epigastrium.  RESPIRATORY:  No cough, shortness of breath  GASTROINTESTINAL:  No nausea, vomiting or diarrhea.  GENITOURINARY:  No dysuria, frequency or urgency.  MUSCULOSKELETAL:  no joint aches, no muscle pain  SKIN:  No change in skin, hair or nails.  NEUROLOGIC:  No paresthesias, fasciculations  PSYCHIATRIC:  No disorder of thought or mood.  ENDOCRINE:  No heat or cold intolerance  HEMATOLOGICAL:  No easy bruising or bleeding.       Physical Exam:  Vital Signs Last 24 Hrs  T(C): 36.8 (10 Mar 2018 02:24), Max: 38.7 (09 Mar 2018 20:11)  T(F): 98.2 (10 Mar 2018 02:24), Max: 101.6 (09 Mar 2018 20:11)  HR: 94 (10 Mar 2018 02:24) (85 - 103)  BP: 102/58 (10 Mar 2018 02:24) (96/64 - 137/74)  RR: 16 (10 Mar 2018 02:24) (16 - 18)  SpO2: 95% (10 Mar 2018 02:24) (94% - 96%)  Weight (kg): 62.6 ( @ 20:11)      GEN: NAD, pleasant  HEENT: normocephalic and atraumatic. EOMI. PERRL.    NECK: Supple.   LUNGS: Clear to auscultation.  HEART: Regular rate and rhythm   ABDOMEN: Soft, nontender, and nondistended.  Positive bowel sounds.    : No CVA tenderness  EXTREMITIES: Without any edema.  MSK: No joint swelling  NEUROLOGIC: Cranial nerves II through XII are grossly intact.  PSYCHIATRIC: Appropriate affect .  SKIN: No rash      Labs:  03-10    139  |  104  |  15.0  ----------------------------<  140<H>  4.1   |  21.0<L>  |  0.55    Ca    8.7      10 Mar 2018 07:16  Phos  2.5     03-10  Mg     1.8     03-10    TPro  5.6<L>  /  Alb  3.0<L>  /  TBili  0.5  /  DBili  x   /  AST  23  /  ALT  16  /  AlkPhos  66  03-10                          8.5    20.0  )-----------( 368      ( 10 Mar 2018 07:16 )             28.9       PT/INR - ( 10 Mar 2018 07:16 )   PT: 12.0 sec;   INR: 1.09 ratio         PTT - ( 10 Mar 2018 07:16 )  PTT:23.6 sec  Urinalysis Basic - ( 09 Mar 2018 21:54 )    Color: Yellow / Appearance: Clear / S.010 / pH: x  Gluc: x / Ketone: Negative  / Bili: Negative / Urobili: Negative mg/dL   Blood: x / Protein: Negative mg/dL / Nitrite: Negative   Leuk Esterase: Moderate / RBC: Negative /HPF / WBC 6-10   Sq Epi: x / Non Sq Epi: Occasional / Bacteria: x      LIVER FUNCTIONS - ( 10 Mar 2018 07:16 )  Alb: 3.0 g/dL / Pro: 5.6 g/dL / ALK PHOS: 66 U/L / ALT: 16 U/L / AST: 23 U/L / GGT: x               RECENT CULTURES:   @ 23:22    RVP  NotDete        EXAM:  CT CHEST                        PROCEDURE DATE:  03/10/2018    INTERPRETATION:  CT CHEST WITHOUT CONTRAST  INDICATION: Sepsis.  TECHNIQUE: Noncontrast CT imaging of the thorax.   COMPARISON: None.  FINDINGS:  Lines and tubes: Partially visualized right chest port.  Airways: Tracheobronchial tree is patent.  Lungs and Pleura: There are multiple rounded and spiculated bilateral   pulmonary nodules. Small bilateral pleural effusions. No cavitation is seen.  Mediastinum and lymph nodes: No mass or hemorrhage. No worrisome   mediastinal adenopathy. No worrisome hilar or axillary adenopathy.   Scattered mildly prominent bilateral axillary lymph nodes, left greater   than right, measuring up to 1.9 cm short axis on the left.  Heart: Normal size. No pericardial effusion.  Vessels: Normal size.  Upper Abdomen: Ill-defined hypodense lesions in the liver measuring up to   1.9 cm in the right hepatic lobe and 1.4 cm in the left, metastatic   disease not excluded.  Bones and soft tissues: No suspicious lesions. Bilateral breast implants.   Hemangioma in T10. Old right scapular fracture.  IMPRESSION:  Innumerable bilateral pulmonary nodules. Differential includes metastatic   disease and septic emboli. No cavitation is seen. Small bilateral pleural effusions.  Mildly prominent bilateral axillary lymph nodes, left greater than right.   Correlate with prior studies.  Nonspecific hepatic lesions. Correlate with prior exams and consider   contrast enhanced liver MRI for further evaluation.

## 2018-03-10 NOTE — CHART NOTE - NSCHARTNOTEFT_GEN_A_CORE
CT head remarkable for possible mets and subacute infarct. Will upgrade to SDU and consult Neurosurgery. Will start keppra prophylactically. Will start dexamethasone 10mg then 4mg q 6 hrs as d/w PA. Will also consult neurology, start statin and hold ASA as pt with brain mass. TTE, carotid doppler, neurochecks, NIH scale, VCD boots to be ordered.     IMPRESSION:  Suspected mass in the left medial posterior parietal/occipital lobe with   surrounding vasogenic edema. Subacute infarct is in the differential.   Further evaluation with a contrast-enhanced brain MRI is recommended, if   there are no contraindications to such exam.    High right parietal calvarial lucency, osseous metastatic lesion cannot   be excluded.

## 2018-03-10 NOTE — CHART NOTE - NSCHARTNOTEFT_GEN_A_CORE
Pt with b/l pleural effusions on CT. Will continue to treat for sepsis likely secondary to community acquired pneumonia bacterial vs viral etiology RVP pending. Pt with multiple sick children with URI at work.

## 2018-03-10 NOTE — ED ADULT NURSE NOTE - OBJECTIVE STATEMENT
Pt received in ambulance triage a&ox3, speaking coherently, and following commands. Code sepsis initiated and code team 2 called to bedside. Pmh/o HTN, HLD, Breast CA first diagnosed in 2014 and s/p chemo, who went into remission 2015 and now in 2017 was re-diagnosed and now with mets to lung, liver, bone, and kidney. According to pt she was feeling increasing lethargy all day today and was found to be febrile at home. Daughter states pt was confused at home and wasn't able to remember simple things and "could hardly stand". Pt currently denies chest pain, SOB, cough, HA, N/V/D, dizziness, numbness/tingling, bladder/bowel dysfunction. No focal deficits noted upon assessment, pt able to MAEx4 with equal strength and purpose. No b/l weakness noted.

## 2018-03-10 NOTE — DISCHARGE NOTE ADULT - CARE PLAN
Principal Discharge DX:	ISAAK (acute kidney injury)  Goal:	resolved  Assessment and plan of treatment:	follow with PMD/ Neurology./ neurosurgery  Secondary Diagnosis:	Anemia in neoplastic disease  Secondary Diagnosis:	Bilateral malignant neoplasm of breast in female, unspecified estrogen receptor status, unspecified site of breast  Secondary Diagnosis:	Essential hypertension  Secondary Diagnosis:	HTN (hypertension)  Secondary Diagnosis:	SIRS due to infectious process without acute organ dysfunction

## 2018-03-10 NOTE — PROGRESS NOTE ADULT - SUBJECTIVE AND OBJECTIVE BOX
HEALTH ISSUES - PROBLEM Dx:  Pt is a 67 yo female with a pmh/o HTN, HLD, Breast CA first diagnosed in  and s/p chemo, who went into remission  and now in 2017 was rediagnosed and now with mets to lung, liver, bone, and kidney.     INTERVAL HPI/ OVERNIGHT EVENTS:  no issues  awaits MRI    Vital Signs Last 24 Hrs  T(C): 37 (11 Mar 2018 07:47), Max: 37 (11 Mar 2018 07:47)  T(F): 98.6 (11 Mar 2018 07:47), Max: 98.6 (11 Mar 2018 07:47)  HR: 86 (11 Mar 2018 11:48) (62 - 88)  BP: 139/75 (11 Mar 2018 11:48) (102/54 - 163/84)  BP(mean): 89 (11 Mar 2018 11:48) (73 - 89)  RR: 20 (11 Mar 2018 11:48) (16 - 23)  SpO2: 96% (11 Mar 2018 11:48) (93% - 96%)    PHYSICAL EXAM-  GEN: NAD, pleasant  HEENT: normocephalic and atraumatic. EOMI. PERRL.    NECK: Supple.   LUNGS: Clear to auscultation.  HEART: Regular rate and rhythm   ABDOMEN: Soft, nontender, and nondistended.  Positive bowel sounds.    : No CVA tenderness  EXTREMITIES: Without any edema.  MSK: No joint swelling  NEUROLOGIC: Cranial nerves II through XII are grossly intact.  PSYCHIATRIC: Appropriate affect .  SKIN: No rash    LABS:                        8.5    20.0  )-----------( 368      ( 10 Mar 2018 07:16 )             28.9     03-10    139  |  104  |  15.0  ----------------------------<  140<H>  4.1   |  21.0<L>  |  0.55    Ca    8.7      10 Mar 2018 07:16  Phos  2.5     03-10  Mg     1.8     03-10    TPro  5.6<L>  /  Alb  3.0<L>  /  TBili  0.5  /  DBili  x   /  AST  23  /  ALT  16  /  AlkPhos  66  03-10    PT/INR - ( 10 Mar 2018 07:16 )   PT: 12.0 sec;   INR: 1.09 ratio         PTT - ( 10 Mar 2018 07:16 )  PTT:23.6 sec  Urinalysis Basic - ( 09 Mar 2018 21:54 )    Color: Yellow / Appearance: Clear / S.010 / pH: x  Gluc: x / Ketone: Negative  / Bili: Negative / Urobili: Negative mg/dL   Blood: x / Protein: Negative mg/dL / Nitrite: Negative   Leuk Esterase: Moderate / RBC: Negative /HPF / WBC 6-10   Sq Epi: x / Non Sq Epi: Occasional / Bacteria: x    Assessment and Plan

## 2018-03-10 NOTE — CONSULT NOTE ADULT - PROBLEM SELECTOR RECOMMENDATION 9
Had fever and leukocytosis  Trend fevers  Trend Leukocytosis  Patient immunosuppressed due to Chemotherapy  Blood cultures pending  UA not significant for UTI  CT Chest more consistent with metastasis and not pneumonia, images reviewed  Will D/C Vancomycin and Zosyn  Start Cefepime 2gm IV q 8hours  Procalcitonin level is 0.06, not suggestive of sepsis  Will follow up cultures  If has fever will need CT A/P with contrast

## 2018-03-10 NOTE — ED ADULT NURSE NOTE - PMH
Breast cancer  Mestatiszied to lung/brain  HTN (hypertension)    Hyperlipidemia, unspecified hyperlipidemia type

## 2018-03-10 NOTE — ED ADULT NURSE REASSESSMENT NOTE - NS ED NURSE REASSESS COMMENT FT1
Received patient at this time skin warm and dry color good on monitor awaiting bed transfer no complaints at this time iv infusing well no signs of infiltration family at bedside will continue to follow

## 2018-03-10 NOTE — PROGRESS NOTE ADULT - ASSESSMENT
67 yo female with active metastatic breast cancer presents with confusion, weakness, and fever meeting SIRS criteria and concerning for sepsis however no identifiable source found at this time.

## 2018-03-10 NOTE — DISCHARGE NOTE ADULT - CARE PROVIDER_API CALL
Roberta Mckeon), Internal Medicine  [address]  [address]  [place] [address]  Phone: [phone]  Fax: [phone]    Alvaro John), Neurosurgery  [address]  [address]  [place] [address]  Phone: [phone]  Fax: [phone]

## 2018-03-10 NOTE — DISCHARGE NOTE ADULT - PATIENT PORTAL LINK FT
You can access the [facility] Portal, offered by [facility], by registering with the following website: [email or web address]

## 2018-03-10 NOTE — CONSULT NOTE ADULT - ASSESSMENT
ASSESSMENT/PLAN:    68F w/ PMH breast cancer w/ mets to lungs/kidney/bone/liver s/p b/l mastectomy currently on chemotherapy presents to ED due to "fever, weakness, and confusion".  CT Scan Brain shows "Suspected mass in the left medial posterior parietal/occipital lobe with surrounding vasogenic edema". Patient remains neurologically intact.     -Discussed case and treatment plan with Dr. Alvaro John. CT Scan images were reviewed by Dr. Alvaro John.   -Admit to medicine service for further evaluation/treatment  -Continue with neurochecks  -HOLD anticoagulation/antiplatelet  -Start Keppra for seizure ppx  -SBP Control (BP goal <140)  -Recommend decadron   -Recommend MRI Brain w/ and w/o contrast for further evaluation unless there are any contraindications   -Recommend heme/oncology consult  -Defer management and treatment of patient's other co-morbid medical conditions to the medicine team.    -Discussed treatment recommendations above with Dr. Digna Ramesh. ASSESSMENT/PLAN:    68F w/ PMH breast cancer w/ mets to lungs/kidney/bone/liver s/p b/l mastectomy currently on chemotherapy presents to ED due to "fever, weakness, and confusion".  CT Scan Brain shows "Suspected mass in the left medial posterior parietal/occipital lobe with surrounding vasogenic edema". Patient remains neurologically intact.     -Discussed case and treatment plan with Dr. Alvaro John. CT Scan images were reviewed by Dr. Alvaro John.   -Admit to medicine service for further evaluation/treatment  -Continue with neurochecks  -HOLD anticoagulation/antiplatelet  -Start Keppra for seizure ppx  -SBP Control (BP goal <140)  -Recommend decadron 4mg q6hrs unless contraindicated  -Recommend MRI Brain w/ and w/o contrast for further evaluation unless there are any contraindications   -Recommend heme/oncology consult  -Recommend neurology consult  -Defer management and treatment of patient's other co-morbid medical conditions to the medicine team.      -Discussed treatment recommendations above with Dr. Digna Ramesh.

## 2018-03-10 NOTE — CONSULT NOTE ADULT - SUBJECTIVE AND OBJECTIVE BOX
HPI:  Source: Patient/Daughter Lindy (present at bedside)    68F w/ PMH HTN, HLD, Breast CA first diagnosed in  and s/p chemo with remission in  and now in re-occurrence in 2017 with mets to lung, liver, bone, and kidney. Presents to emergency room today due to "fever, weakness, and confusion".  Per daughter, the patient felt "wobbly when trying to walk". She had a temperature of "103'1F' at home, and "a few episodes of confusion". For example, the patient is a [employer] supporter but could not recall who the president was when she was asked this question today.  Last chemo session was on Wednesday 3/7/18. She reports "she usually feels tired, flushed, and exhausted" after chemotherapy but "never this bad".      Pt is awake, alert and lying comfortably in bed.  Denies any chills, headaches, dizziness, visual changes, shortness of breath, chest pain, palpitations, hematuria, abdominal pain, nausea/vomiting, and pain in all extremities.  Denies any loss of bowel/bladder function.  Denies any new injury, falls, trauma.     PAST MEDICAL & SURGICAL HISTORY:  Hyperlipidemia, unspecified hyperlipidemia type  Breast cancer  HTN (hypertension)   delivery delivered  H/O mastectomy, bilateral    REVIEW OF SYSTEMS:  CONSTITUTIONAL: (+) fever, Denies any chills, weight loss, or fatigue  EYES: No eye pain, visual disturbances, or discharge  ENMT:  No difficulty hearing, tinnitus, vertigo; No sinus or throat pain  NECK: No pain or stiffness  BREASTS: No pain, masses, or nipple discharge  RESPIRATORY: (+) cough, Denies any wheezing, chills or hemoptysis; No shortness of breath  CARDIOVASCULAR: No chest pain, palpitations, dizziness, or leg swelling  GASTROINTESTINAL: No abdominal or epigastric pain. No nausea, vomiting, or hematemesis; No diarrhea or constipation. No melena or hematochezia.  GENITOURINARY: No dysuria, frequency, hematuria, or incontinence  NEUROLOGICAL: No headaches, memory loss, numbness, or tremors. (+) weakness and loss of strength  SKIN: No itching, burning, rashes, or lesions   LYMPH NODES: No enlarged glands  ENDOCRINE: No heat or cold intolerance; No hair loss  MUSCULOSKELETAL: No joint pain or swelling; No muscle, back, or extremity pain  PSYCHIATRIC: No depression, anxiety, mood swings, or difficulty sleeping  HEME/LYMPH: No easy bruising, or bleeding gums  ALLERGY AND IMMUNOLOGIC: No hives or eczema    Allergies:  No Known Allergies    Intolerances      MEDICATIONS  (STANDING):  acetaminophen  IVPB 1000 milliGRAM(s) IV Intermittent once  amLODIPine   Tablet 5 milliGRAM(s) Oral daily  dexamethasone     Tablet 10 milliGRAM(s) Oral once  dexamethasone     Tablet 4 milliGRAM(s) Oral every 6 hours  lactated ringers. 1000 milliLiter(s) (125 mL/Hr) IV Continuous <Continuous>  levETIRAcetam 500 milliGRAM(s) Oral two times a day  loratadine 10 milliGRAM(s) Oral daily  metoprolol succinate ER 50 milliGRAM(s) Oral daily  pantoprazole    Tablet 40 milliGRAM(s) Oral before breakfast  piperacillin/tazobactam IVPB. 3.375 Gram(s) IV Intermittent every 8 hours  simvastatin 20 milliGRAM(s) Oral at bedtime  vancomycin  IVPB 1000 milliGRAM(s) IV Intermittent every 12 hours    MEDICATIONS  (PRN):  acetaminophen   Tablet 650 milliGRAM(s) Oral every 6 hours PRN For Temp greater than 38 C (100.4 F)  acetaminophen   Tablet. 650 milliGRAM(s) Oral every 6 hours PRN Moderate Pain (4 - 6)  ALBUTerol    0.083% 2.5 milliGRAM(s) Nebulizer every 6 hours PRN Shortness of Breath and/or Wheezing  ondansetron Injectable 4 milliGRAM(s) IV Push every 6 hours PRN Nausea    SOCIAL HISTORY:  FAMILY HISTORY:  Maternal family history of cancer (Mother, Sibling): mother rectal ca  sister fallopian tube ca    Vital Signs Last 24 Hrs  T(C): 36.8 (10 Mar 2018 02:24), Max: 38.7 (09 Mar 2018 20:11)  T(F): 98.2 (10 Mar 2018 02:24), Max: 101.6 (09 Mar 2018 20:11)  HR: 94 (10 Mar 2018 02:24) (85 - 103)  BP: 102/58 (10 Mar 2018 02:24) (96/64 - 137/74)  BP(mean): --  RR: 16 (10 Mar 2018 02:24) (16 - 18)  SpO2: 95% (10 Mar 2018 02:24) (94% - 96%)    PHYSICAL EXAM:    GENERAL: NAD, well-groomed, well-developed  HEAD:  Atraumatic, Normocephalic  EYES: EOMI, PERRLA, conjunctiva and sclera clear  ENMT: No tonsillar erythema, exudates, or enlargement; Moist mucous membranes, Good dentition, No lesions  NECK: Supple, No JVD, Normal thyroid  NERVOUS SYSTEM:  Alert & Oriented X3, Good concentration; Motor Strength 5/5 B/L upper and lower extremities; DTRs 2+ intact and symmetric  CHEST/LUNG: Clear to percussion bilaterally; No rales, rhonchi, wheezing, or rubs  HEART: Regular rate and rhythm; No murmurs, rubs, or gallops  ABDOMEN: Soft, Nontender, Nondistended; Bowel sounds present  EXTREMITIES:  2+ Peripheral Pulses, No clubbing, cyanosis, or edema  LYMPH: No lymphadenopathy noted  SKIN: No rashes or lesions    LABS:                        9.5    17.8  )-----------( 439      ( 09 Mar 2018 20:35 )             31.6     03-    136  |  97<L>  |  21.0<H>  ----------------------------<  111  4.2   |  24.0  |  0.76    Ca    9.2      09 Mar 2018 20:35    TPro  6.1<L>  /  Alb  3.4  /  TBili  0.4  /  DBili  x   /  AST  33<H>  /  ALT  21  /  AlkPhos  72  03-09    PT/INR - ( 09 Mar 2018 20:35 )   PT: 11.8 sec;   INR: 1.07 ratio         PTT - ( 09 Mar 2018 20:35 )  PTT:24.5 sec  Urinalysis Basic - ( 09 Mar 2018 21:54 )    Color: Yellow / Appearance: Clear / S.010 / pH: x  Gluc: x / Ketone: Negative  / Bili: Negative / Urobili: Negative mg/dL   Blood: x / Protein: Negative mg/dL / Nitrite: Negative   Leuk Esterase: Moderate / RBC: Negative /HPF / WBC 6-10   Sq Epi: x / Non Sq Epi: Occasional / Bacteria: x        RADIOLOGY & ADDITIONAL STUDIES:  ~~~~~~~~~~~~~~~~~~~~~~~~~~~~~~ HPI:  Source: Patient/Daughter Lindy (present at bedside)    68F w/ PMH HTN, HLD, Breast CA first diagnosed in  and s/p chemo with remission in  and now in re-occurrence in 2017 with mets to lung, liver, bone, and kidney. Presents to emergency room today due to "fever, weakness, and confusion".  Per daughter, the patient felt "wobbly when trying to walk". She had a temperature of "103'1F' at home, and "a few episodes of confusion". For example, the patient is a [employer] supporter but could not recall who the president was when she was asked this question today.  Last chemo session was on Wednesday 3/7/18. She reports "she usually feels tired, flushed, and exhausted" after chemotherapy but "never this bad".      Pt is awake, alert and lying comfortably in bed.  Denies any chills, headaches, dizziness, visual changes, shortness of breath, chest pain, palpitations, hematuria, abdominal pain, nausea/vomiting, and pain in all extremities.  Denies any loss of bowel/bladder function.  Denies any new injury, falls, trauma. Denies any anticoagulation/antiplatelet therapy.     PAST MEDICAL & SURGICAL HISTORY:  Hyperlipidemia, unspecified hyperlipidemia type  Breast cancer  HTN (hypertension)   delivery delivered  H/O mastectomy, bilateral    REVIEW OF SYSTEMS:  CONSTITUTIONAL: (+) fever, Denies any chills, weight loss, or fatigue  EYES: No eye pain, visual disturbances, or discharge  ENMT:  No difficulty hearing, tinnitus, vertigo; No sinus or throat pain  NECK: No pain or stiffness  BREASTS: No pain, masses, or nipple discharge  RESPIRATORY: (+) cough, Denies any wheezing, chills or hemoptysis; No shortness of breath  CARDIOVASCULAR: No chest pain, palpitations, dizziness, or leg swelling  GASTROINTESTINAL: No abdominal or epigastric pain. No nausea, vomiting, or hematemesis; No diarrhea or constipation. No melena or hematochezia.  GENITOURINARY: No dysuria, frequency, hematuria, or incontinence  NEUROLOGICAL: No headaches, memory loss, numbness, or tremors. (+) weakness and loss of strength  SKIN: No itching, burning, rashes, or lesions   LYMPH NODES: No enlarged glands  ENDOCRINE: No heat or cold intolerance; No hair loss  MUSCULOSKELETAL: No joint pain or swelling; No muscle, back, or extremity pain  PSYCHIATRIC: No depression, anxiety, mood swings, or difficulty sleeping  HEME/LYMPH: No easy bruising, or bleeding gums  ALLERGY AND IMMUNOLOGIC: No hives or eczema    Allergies:  No Known Allergies    MEDICATIONS  (STANDING):  acetaminophen  IVPB 1000 milliGRAM(s) IV Intermittent once  amLODIPine   Tablet 5 milliGRAM(s) Oral daily  dexamethasone     Tablet 10 milliGRAM(s) Oral once  dexamethasone     Tablet 4 milliGRAM(s) Oral every 6 hours  lactated ringers. 1000 milliLiter(s) (125 mL/Hr) IV Continuous <Continuous>  levETIRAcetam 500 milliGRAM(s) Oral two times a day  loratadine 10 milliGRAM(s) Oral daily  metoprolol succinate ER 50 milliGRAM(s) Oral daily  pantoprazole    Tablet 40 milliGRAM(s) Oral before breakfast  piperacillin/tazobactam IVPB. 3.375 Gram(s) IV Intermittent every 8 hours  simvastatin 20 milliGRAM(s) Oral at bedtime  vancomycin  IVPB 1000 milliGRAM(s) IV Intermittent every 12 hours    MEDICATIONS  (PRN):  acetaminophen   Tablet 650 milliGRAM(s) Oral every 6 hours PRN For Temp greater than 38 C (100.4 F)  acetaminophen   Tablet. 650 milliGRAM(s) Oral every 6 hours PRN Moderate Pain (4 - 6)  ALBUTerol    0.083% 2.5 milliGRAM(s) Nebulizer every 6 hours PRN Shortness of Breath and/or Wheezing  ondansetron Injectable 4 milliGRAM(s) IV Push every 6 hours PRN Nausea    FAMILY HISTORY:  Maternal family history of cancer (Mother, Sibling): mother rectal ca  sister fallopian tube ca    Vital Signs Last 24 Hrs  T(C): 36.8 (10 Mar 2018 02:24), Max: 38.7 (09 Mar 2018 20:11)  T(F): 98.2 (10 Mar 2018 02:24), Max: 101.6 (09 Mar 2018 20:11)  HR: 94 (10 Mar 2018 02:24) (85 - 103)  BP: 102/58 (10 Mar 2018 02:24) (96/64 - 137/74)  BP(mean): --  RR: 16 (10 Mar 2018 02:24) (16 - 18)  SpO2: 95% (10 Mar 2018 02:24) (94% - 96%)    PHYSICAL EXAM:  GENERAL: NAD, well-groomed, well-developed  HEAD:  Atraumatic, Normocephalic  EYES: EOMI, PERRLA, conjunctiva and sclera clear, visual fields are grossly intact.   ENMT: No tonsillar erythema, exudates, or enlargement; Moist mucous membranes, Good dentition, No lesions  NECK: Supple, No JVD, Normal thyroid  NERVOUS SYSTEM:  Alert & Oriented X3, Good concentration; No facial droop noted, Able to follow commands, Motor Strength 5/5 B/L upper and lower extremities; DTRs 2+ intact and symmetric, Sensation intact to soft touch in all extremities, Negative pronator drift.  CHEST/LUNG: Clear to percussion bilaterally; No rales, rhonchi, wheezing, or rubs  HEART: Regular rate and rhythm; No murmurs, rubs, or gallops  ABDOMEN: Soft, Nontender, Nondistended; Bowel sounds present  EXTREMITIES:  2+ Peripheral Pulses, No clubbing, cyanosis, or edema  LYMPH: No lymphadenopathy noted  SKIN: No rashes or lesions    LABS:                        9.5    17.8  )-----------( 439      ( 09 Mar 2018 20:35 )             31.6     03-    136  |  97<L>  |  21.0<H>  ----------------------------<  111  4.2   |  24.0  |  0.76    Ca    9.2      09 Mar 2018 20:35    TPro  6.1<L>  /  Alb  3.4  /  TBili  0.4  /  DBili  x   /  AST  33<H>  /  ALT  21  /  AlkPhos  72  03-09    PT/INR - ( 09 Mar 2018 20:35 )   PT: 11.8 sec;   INR: 1.07 ratio         PTT - ( 09 Mar 2018 20:35 )  PTT:24.5 sec  Urinalysis Basic - ( 09 Mar 2018 21:54 )    Color: Yellow / Appearance: Clear / S.010 / pH: x  Gluc: x / Ketone: Negative  / Bili: Negative / Urobili: Negative mg/dL   Blood: x / Protein: Negative mg/dL / Nitrite: Negative   Leuk Esterase: Moderate / RBC: Negative /HPF / WBC 6-10   Sq Epi: x / Non Sq Epi: Occasional / Bacteria: x      RADIOLOGY & ADDITIONAL STUDIES:  ~~~~~~~~~~~~~~~~~~~~~~~~~~~~~~    < from: CT Head No Cont (03.10.18 @ 01:32) >   EXAM:  CT BRAIN                          PROCEDURE DATE:  03/10/2018          INTERPRETATION:  CLINICAL HISTORY:  Altered mental status.    TECHNIQUE:  CT of the head without contrast.  Contiguous transaxial images of the head were acquired from the skull   base to the vertex without the administration of iodinated contrast.   Coronal and sagittal reformatted images are provided.     COMPARISON:  None available.    FINDINGS:    There is an area of asymmetric low attenuation in the left posterior   medial parietal and occipital lobes with adjacent sulcal effacement from   mass effect likely representing vasogenic edema. A region of ovoid   hypoattenuation within the edema is suggested on series 2 image 28   measuring 1.8 cm. There is no evidence for large. A chronic appearing   small infarct is seen in the right thalamus.    The ventricles, sulci and cisternal spaces are unremarkable in size and   configuration for the patient's stated age. There is no midline shift   shift or abnormal extra-axial fluid collection.    There is a nonspecific high right parietal calvarial lucency near the   vertex. Is no calvarial fracture. Small retention cysts versus polyps are   seen in the maxillary sinuses. The remaining paranasal sinuses and   mastoid air cells are clear.    IMPRESSION:  Suspected mass in the left medial posterior parietal/occipital lobe with   surrounding vasogenic edema. Subacute infarct is in the differential.   Further evaluation with a contrast-enhanced brain MRI is recommended, if   there are no contraindications to such exam.    High right parietal calvarial lucency, osseous metastatic lesion cannot   be excluded.      SHONA CHASE M.D., RADIOLOGIST  This document has been electronically signed. Mar 10 2018  2:29AM        < end of copied text >    < from: CT Chest No Cont (03.10.18 @ 01:32) >   EXAM:  CT CHEST                          PROCEDURE DATE:  03/10/2018          INTERPRETATION:  CT CHEST WITHOUT CONTRAST    INDICATION: Sepsis.    TECHNIQUE: Noncontrast CT imaging of the thorax.     COMPARISON: None.    FINDINGS:    Lines and tubes: Partially visualized right chest port.  Airways: Tracheobronchial tree is patent.  Lungs and Pleura: There are multiple rounded and spiculated bilateral   pulmonary nodules. Small bilateral pleural effusions. No cavitation is   seen.  Mediastinum and lymph nodes: No mass or hemorrhage. No worrisome   mediastinal adenopathy. No worrisome hilar or axillary adenopathy.   Scattered mildly prominent bilateral axillary lymph nodes, left greater   than right, measuring up to 1.9 cm short axis on the left.  Heart: Normal size. No pericardial effusion.  Vessels: Normal size.    Upper Abdomen: Ill-defined hypodense lesions in the liver measuring up to   1.9 cm in the right hepatic lobe and 1.4 cm in the left, metastatic   disease not excluded.    Bones and soft tissues: No suspicious lesions. Bilateral breast implants.   Hemangioma in T10. Old right scapular fracture.    IMPRESSION:    Innumerable bilateral pulmonary nodules. Differential includes metastatic   disease and septic emboli. No cavitation is seen. Small bilateral pleural   effusions.    Mildly prominent bilateral axillary lymph nodes, left greater than right.   Correlate with prior studies.    Nonspecific hepatic lesions. Correlate with prior exams and consider   contrast enhanced liver MRI for further evaluation.      SAHRA POWER M.D., ATTENDING RADIOLOGIST  This document has been electronically signed. Mar 10 2018  1:51AM    < end of copied text >

## 2018-03-10 NOTE — ED ADULT NURSE REASSESSMENT NOTE - NS ED NURSE REASSESS COMMENT FT1
pt. returns from MRI. pt. a&ox3. pt. denies pain or discomfort at this time. vss. as documented. LR infusing 75ml/hr. awaiting bed. will continue to monitor.

## 2018-03-10 NOTE — CONSULT NOTE ADULT - ASSESSMENT
Imp:  68 year old female, with triple negative metastatic breast cancer (BRCA neg) admitted with fevers, possible PNA, on IV antibiotics.  found to have mass in left parieto-occipital region, concerning for metastatic disease  Rec:  awaiting MRI of brain; continue Decadron for edema          Ab's for possible infection          If brain mets, will need RT/ +/- Neurosurg          Will follow  D/W patient/Daughter

## 2018-03-10 NOTE — CONSULT NOTE ADULT - CONSULT REASON
68F w/ PMH breast cancer w/ mets to lungs/kidney/bone/liver s/p b/l mastectomy currently on chemotherapy presents to ED due to "fever, weakness, and confusion".  CT Scan Brain shows "Suspected mass in the left medial posterior parietal/occipital lobe with surrounding vasogenic edema".     Neurosurgery was contacted by Dr. Digna Ramesh at 02:52AM for a neurosurgical consultation. The patient was seen immediately and my attending neurosurgeon Dr. Alvaro John was notified.

## 2018-03-10 NOTE — CONSULT NOTE ADULT - ASSESSMENT
The patient is a 68y Female with brain mass.  Agree that subacute infarct would be in differential diagnosis.    Will need MRI brain with contrast to evaluate further.    Already seen by neurosurgery.     Agree with decadron and Keppra.     Will follow with you.

## 2018-03-11 LAB — LEGIONELLA AG UR QL: NEGATIVE — SIGNIFICANT CHANGE UP

## 2018-03-11 PROCEDURE — 99231 SBSQ HOSP IP/OBS SF/LOW 25: CPT

## 2018-03-11 PROCEDURE — 99233 SBSQ HOSP IP/OBS HIGH 50: CPT

## 2018-03-11 RX ORDER — DEXAMETHASONE 0.5 MG/5ML
4 ELIXIR ORAL EVERY 6 HOURS
Qty: 0 | Refills: 0 | Status: DISCONTINUED | OUTPATIENT
Start: 2018-03-11 | End: 2018-03-12

## 2018-03-11 RX ORDER — DEXAMETHASONE 0.5 MG/5ML
4 ELIXIR ORAL EVERY 12 HOURS
Qty: 0 | Refills: 0 | Status: CANCELLED | OUTPATIENT
Start: 2018-03-16 | End: 2018-03-12

## 2018-03-11 RX ORDER — DEXAMETHASONE 0.5 MG/5ML
4 ELIXIR ORAL DAILY
Qty: 0 | Refills: 0 | Status: CANCELLED | OUTPATIENT
Start: 2018-03-18 | End: 2018-03-12

## 2018-03-11 RX ORDER — DEXAMETHASONE 0.5 MG/5ML
4 ELIXIR ORAL EVERY 8 HOURS
Qty: 0 | Refills: 0 | Status: DISCONTINUED | OUTPATIENT
Start: 2018-03-13 | End: 2018-03-12

## 2018-03-11 RX ORDER — DEXAMETHASONE 0.5 MG/5ML
ELIXIR ORAL
Qty: 0 | Refills: 0 | Status: DISCONTINUED | OUTPATIENT
Start: 2018-03-11 | End: 2018-03-12

## 2018-03-11 RX ADMIN — LORATADINE 10 MILLIGRAM(S): 10 TABLET ORAL at 11:53

## 2018-03-11 RX ADMIN — Medication 50 MILLIGRAM(S): at 05:22

## 2018-03-11 RX ADMIN — LEVETIRACETAM 500 MILLIGRAM(S): 250 TABLET, FILM COATED ORAL at 18:01

## 2018-03-11 RX ADMIN — Medication 4 MILLIGRAM(S): at 05:22

## 2018-03-11 RX ADMIN — PANTOPRAZOLE SODIUM 40 MILLIGRAM(S): 20 TABLET, DELAYED RELEASE ORAL at 05:22

## 2018-03-11 RX ADMIN — SIMVASTATIN 20 MILLIGRAM(S): 20 TABLET, FILM COATED ORAL at 23:00

## 2018-03-11 RX ADMIN — CEFEPIME 100 MILLIGRAM(S): 1 INJECTION, POWDER, FOR SOLUTION INTRAMUSCULAR; INTRAVENOUS at 22:55

## 2018-03-11 RX ADMIN — Medication 4 MILLIGRAM(S): at 18:01

## 2018-03-11 RX ADMIN — CEFEPIME 100 MILLIGRAM(S): 1 INJECTION, POWDER, FOR SOLUTION INTRAMUSCULAR; INTRAVENOUS at 05:22

## 2018-03-11 RX ADMIN — CEFEPIME 100 MILLIGRAM(S): 1 INJECTION, POWDER, FOR SOLUTION INTRAMUSCULAR; INTRAVENOUS at 13:57

## 2018-03-11 RX ADMIN — Medication 4 MILLIGRAM(S): at 11:53

## 2018-03-11 RX ADMIN — Medication 4 MILLIGRAM(S): at 23:01

## 2018-03-11 RX ADMIN — LEVETIRACETAM 500 MILLIGRAM(S): 250 TABLET, FILM COATED ORAL at 05:22

## 2018-03-11 RX ADMIN — AMLODIPINE BESYLATE 5 MILLIGRAM(S): 2.5 TABLET ORAL at 05:22

## 2018-03-11 NOTE — PROGRESS NOTE ADULT - ASSESSMENT
67y/o  Female h/o HTN, HLD, Breast CA first diagnosed in 2014 and s/p chemo, who went into remission 2015 and now in 2017 was re-diagnosed and now with mets to lung, liver, bone, and kidney. Patient started Chemotherapy in October 2017 and last chemotherapy was last week Wednesday. Here with Fever and chills x 1day

## 2018-03-11 NOTE — PROGRESS NOTE ADULT - ASSESSMENT
Imp:  68 year old female, with triple negative metastatic breast cancer (BRCA neg) admitted with fevers, possible PNA, on IV antibiotics.  found to have mass in left parieto-occipital region, concerning for metastatic disease  Rec:  MRI of brain show multiple brain mets; continue Decadron for edema          Needs RT consult tomorrow          Ab's for possible infection          Discharge when stable          Will follow  D/W patient/Daughter

## 2018-03-11 NOTE — PROGRESS NOTE ADULT - SUBJECTIVE AND OBJECTIVE BOX
[facility]                                        [facility]                                 Michael Cruz, & Salvador                                  [address]                                        [place], [place], [address]                                             [phone]        No new complaints.    Vital Signs Last 24 Hrs  T(F): 98.6 (11 Mar 2018 07:47), Max: 98.6 (11 Mar 2018 07:47)  HR: 83 (11 Mar 2018 08:00) (62 - 90)  BP: 123/57 (11 Mar 2018 08:00) (102/54 - 163/84)  BP(mean): 73 (11 Mar 2018 08:00) (73 - 73)  RR: 23 (11 Mar 2018 08:00) (16 - 23)  SpO2: 96% (11 Mar 2018 08:00) (93% - 97%)    Awake and alert.  Pupils react.  Face symmetric.  Good strength bilaterally.    MRI brain shows numerous supratentorial and infratentorial lesions consistent with metastases.

## 2018-03-11 NOTE — PROGRESS NOTE ADULT - ASSESSMENT
The patient is a 68y Female with breast cancer and brain metastases.     Seen by oncology.     Will need Rad Onc evaluation as well.

## 2018-03-11 NOTE — PROGRESS NOTE ADULT - PROBLEM SELECTOR PLAN 1
Had fever and leukocytosis  Afebrile  Trend Leukocytosis  Patient immunosuppressed due to Chemotherapy  Blood cultures pending  UA not significant for UTI  CT Chest more consistent with metastasis and not pneumonia  Continue Cefepime 2gm IV q 8hours  Procalcitonin level is 0.06, not suggestive of sepsis  Will follow up cultures  If has fever will need CT A/P with contrast

## 2018-03-11 NOTE — PROGRESS NOTE ADULT - ASSESSMENT
67 yo female with active metastatic breast cancer presents with confusion, weakness, and fever meeting SIRS criteria and concerning for sepsis however no identifiable source found at this time.     quick taper of steroids for cerebral edema

## 2018-03-11 NOTE — PROGRESS NOTE ADULT - SUBJECTIVE AND OBJECTIVE BOX
HPI:  Pt is a 67 yo female with a pmh/o HTN, HLD, Breast CA first diagnosed in 2014 and s/p chemo, who went into remission 2015 and now in 2017 was rediagnosed and now with mets to lung, liver, bone, and kidney.     INTERVAL EVENTS:  No events overnight. Patient is sitting up in bed, feeling well. Denies focal numbness, weakness, vision changes, confusion, headaches, or gait unsteadiness. No new complaints.    Vital Signs Last 24 Hrs  T(C): 37 (11 Mar 2018 07:47), Max: 37 (11 Mar 2018 07:47)  T(F): 98.6 (11 Mar 2018 07:47), Max: 98.6 (11 Mar 2018 07:47)  HR: 86 (11 Mar 2018 11:48) (62 - 88)  BP: 139/75 (11 Mar 2018 11:48) (102/54 - 163/84)  BP(mean): 89 (11 Mar 2018 11:48) (73 - 89)  RR: 20 (11 Mar 2018 11:48) (16 - 23)  SpO2: 96% (11 Mar 2018 11:48) (93% - 96%)  PHYSICAL EXAM:  GENERAL: NAD, well-groomed, well-developed  HEAD:  Atraumatic, Normocephalic  EYES: EOMI, PERRLA, conjunctiva and sclera clear  ENMT: Moist mucous membranes, Good dentition, No lesions  NECK: Supple, No JVD  NERVOUS SYSTEM:  Alert & Oriented X3, Good concentration; No facial droop noted, Able to follow commands, Motor Strength 5/5 LUE/LLE, 4/5 RUE/RLE, + RUE pronator drift, +b/l dysmetria  CHEST/LUNG: Clear to percussion bilaterally  HEART: Regular rate and rhythm  ABDOMEN: Soft, Nontender, Nondistended; Bowel sounds present  EXTREMITIES:  2+ Peripheral Pulses  SKIN: No rashes or lesions      LABS:              8.5    20.0  )-----------( 368                  28.9     139  |  104  |  15.0  ----------------------------<  140<H>  4.1   |  21.0<L>  |  0.55    Ca    8.7      10 Mar 2018 07:16  Phos  2.5     03-10  Mg     1.8     03-10    TPro  5.6<L>  /  Alb  3.0<L>  /  TBili  0.5  /  DBili  x   /  AST  23  /  ALT  16  /  AlkPhos  66  03-10    RADIOLOGY & ADDITIONAL TESTS:  MR Head w/wo IV Cont (03.10.18 @ 16:45)  FINDINGS: Numerous enhancing nodules throughout the bilateral cerebral   hemispheres, left basal ganglia,and posterior fossa compatible with   metastasis. Largest lesion is in the left occipital lobe which measures   2.2 cm. Moderate vasogenic edema in the left occipital lobe. Mild   vasogenic edema in the posterior right frontal lobe. 5 mm cavernoma in  the posterior right frontal lobe in image 26, series 9.    Old right thalamic lacunar infarct. Old tiny anterior right basal ganglia   lacunar infarct.  There is no abnormal restricted diffusion to suggest acute infarction.   Scattered periventricular and subcortical white matter T2 /FLAIR   hyperintensities are seen without mass effect, nonspecific, likely   representing trace chronic microvascular changes.  Normal T2 flow-voids are seen within  the intracranial vasculature. The   lateral ventricles and cortical sulci are age-appropriate in size and   configuration.  There is no other susceptibility artifact to suggest   hemorrhage. Midline structures are normal.      Mild inflammatory mucosal changes are seen throughout the various   portions of the paranasal sinuses. 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 The orbits and mastoid air cells are otherwise   unremarkable.     IMPRESSION: Numerous diffuse supratentorial and infratentorial   intracranial metastasis.

## 2018-03-11 NOTE — PROGRESS NOTE ADULT - ASSESSMENT
68F w/ PMH breast cancer w/ mets to lungs/kidney/bone/liver s/p b/l mastectomy currently on chemotherapy presents to ED due to "fever, weakness, and confusion".  CT Scan Brain shows "Suspected mass in the left medial posterior parietal/occipital lobe with surrounding vasogenic edema". MRI Brain w/wo confirms.  Diffuse intracranial metastases without mass effect of hydrocephalus.    PLAN:  -Discussed case and treatment plan with Alvaro.  -Keppra for seizure ppx  -SBP Control (BP goal <140)  -Recommend decadron 4mg q6hrs unless contraindicated  -Recommend heme/oncology, neurology consult    No indication for neurosurgical intervention. Defer to primary/specialty teams as above. No further inpatient recommendations from NSG perspective.

## 2018-03-11 NOTE — PROGRESS NOTE ADULT - SUBJECTIVE AND OBJECTIVE BOX
[facility]  INFECTIOUS DISEASES AND INTERNAL MEDICINE at [place]  =======================================================  Zechariah Yung MD  Diplomates American Board of Internal Medicine and Infectious Diseases  =======================================================    DARA JACKSON [id number]    Follow up: SIRS    No complaints  No fevers    Allergies:  No Known Allergies      Antibiotics:  cefepime  IVPB 2000 milliGRAM(s) IV Intermittent every 8 hours      REVIEW OF SYSTEMS:  CONSTITUTIONAL:  + Fever and chills  HEENT:  No diplopia or blurred vision.  No earache, sore throat or runny nose.  CARDIOVASCULAR:  No pressure, squeezing, strangling, tightness, heaviness or aching about the chest, neck, axilla or epigastrium.  RESPIRATORY:  No cough, shortness of breath  GASTROINTESTINAL:  No nausea, vomiting or diarrhea.  GENITOURINARY:  No dysuria, frequency or urgency.  MUSCULOSKELETAL:  no joint aches, no muscle pain  SKIN:  No change in skin, hair or nails.  NEUROLOGIC:  No paresthesias, fasciculations  PSYCHIATRIC:  No disorder of thought or mood.  ENDOCRINE:  No heat or cold intolerance  HEMATOLOGICAL:  No easy bruising or bleeding.       Physical Exam:  Vital Signs Last 24 Hrs  T(C): 37 (11 Mar 2018 07:47), Max: 37 (11 Mar 2018 07:47)  T(F): 98.6 (11 Mar 2018 07:47), Max: 98.6 (11 Mar 2018 07:47)  HR: 83 (11 Mar 2018 08:00) (62 - 90)  BP: 123/57 (11 Mar 2018 08:00) (102/54 - 163/84)  BP(mean): 73 (11 Mar 2018 08:00) (73 - 73)  RR: 23 (11 Mar 2018 08:00) (16 - 23)  SpO2: 96% (11 Mar 2018 08:00) (93% - 97%)      GEN: NAD, pleasant  HEENT: normocephalic and atraumatic. EOMI. PERRL.    NECK: Supple.   LUNGS: Clear to auscultation.  HEART: Regular rate and rhythm   ABDOMEN: Soft, nontender, and nondistended.  Positive bowel sounds.    : No CVA tenderness  EXTREMITIES: Without any edema.  MSK: No joint swelling  NEUROLOGIC: Cranial nerves II through XII are grossly intact.  PSYCHIATRIC: Appropriate affect .  SKIN: No rash        Labs:  03-10    139  |  104  |  15.0  ----------------------------<  140<H>  4.1   |  21.0<L>  |  0.55    Ca    8.7      10 Mar 2018 07:16  Phos  2.5     03-10  Mg     1.8     03-10    TPro  5.6<L>  /  Alb  3.0<L>  /  TBili  0.5  /  DBili  x   /  AST  23  /  ALT  16  /  AlkPhos  66  03-10                          8.5    20.0  )-----------( 368      ( 10 Mar 2018 07:16 )             28.9       PT/INR - ( 10 Mar 2018 07:16 )   PT: 12.0 sec;   INR: 1.09 ratio         PTT - ( 10 Mar 2018 07:16 )  PTT:23.6 sec  Urinalysis Basic - ( 09 Mar 2018 21:54 )    Color: Yellow / Appearance: Clear / S.010 / pH: x  Gluc: x / Ketone: Negative  / Bili: Negative / Urobili: Negative mg/dL   Blood: x / Protein: Negative mg/dL / Nitrite: Negative   Leuk Esterase: Moderate / RBC: Negative /HPF / WBC 6-10   Sq Epi: x / Non Sq Epi: Occasional / Bacteria: x      LIVER FUNCTIONS - ( 10 Mar 2018 07:16 )  Alb: 3.0 g/dL / Pro: 5.6 g/dL / ALK PHOS: 66 U/L / ALT: 16 U/L / AST: 23 U/L / GGT: x             RECENT CULTURES:   @ 23:22      [facility]

## 2018-03-11 NOTE — PROGRESS NOTE ADULT - SUBJECTIVE AND OBJECTIVE BOX
HPI: Patient is a 68y Female seen on consultation for the evaluation and management of metastatic breast cancer.  She was diagnosed with Triple negative breast cancer in , treated with bilateral Mx/reconstruction 10/27/14, followed by dose dense AC-T (Stage IIA).  Found to have recurrence, metastatic disease to liver , lung, bone, treated with Taxotere, Gemzar, last treatment 3/7/18;  PET scan 18 showed improvement, but new bony lesion T5.  Developed fever to 103 last night with reported confusion, weakness, vomiting.  CT chest showed innumerable pulmonary nodules; CT brain showed left medial post parietal/occipital lobe lesion with surrounding vasogenic edema.  Also noted calvarial lucency.  Seen in ER, awake, alert, comfortable.  Denies SOB, chest pain, headaches or dizziness.  No further nausea; denies bone pain.    Subsequently has had MRI of brain which shows diffuse supertentorial and infratentorila lesions, c/w met disease.  She remains asymptomatic; no headaches or dizziness, no further fevers    PAST MEDICAL & SURGICAL HISTORY:  Hyperlipidemia, unspecified hyperlipidemia type  Breast cancer: Mestatiszied to lung/brain  HTN (hypertension)  History of breast augmentation   delivery delivered  H/O mastectomy, bilateral      REVIEW OF SYSTEMS      General:no fatigue; appetite fair.  Felt ill with high fever	    Skin/Breast:reconstructive surgery  	  Ophthalmologic:Denies blurry vision or diplopia  	  ENMT:	denies sore throat, dysphagia, odynophagia    Respiratory and Thorax:denies SOB or cough  	  Cardiovascular:	no chest pain or palpitations    Gastrointestinal:	no abdominal pain    Genitourinary:	denied dysuria    Musculoskeletal:	no bone pain    Neurological:	denies weakness, seizure activity        Hematology/Lymphatics:	deneis bleeding        	    MEDICATIONS  (STANDING):  acetaminophen  IVPB 1000 milliGRAM(s) IV Intermittent once  amLODIPine   Tablet 5 milliGRAM(s) Oral daily  cefepime  IVPB 2000 milliGRAM(s) IV Intermittent every 8 hours  dexamethasone     Tablet 4 milliGRAM(s) Oral every 6 hours  levETIRAcetam 500 milliGRAM(s) Oral two times a day  loratadine 10 milliGRAM(s) Oral daily  metoprolol succinate ER 50 milliGRAM(s) Oral daily  pantoprazole    Tablet 40 milliGRAM(s) Oral before breakfast  simvastatin 20 milliGRAM(s) Oral at bedtime    MEDICATIONS  (PRN):  acetaminophen   Tablet 650 milliGRAM(s) Oral every 6 hours PRN For Temp greater than 38 C (100.4 F)  acetaminophen   Tablet. 650 milliGRAM(s) Oral every 6 hours PRN Moderate Pain (4 - 6)  ALBUTerol    0.083% 2.5 milliGRAM(s) Nebulizer every 6 hours PRN Shortness of Breath and/or Wheezing  ondansetron Injectable 4 milliGRAM(s) IV Push every 6 hours PRN Nausea      Allergies    No Known Allergies    Intolerances        SOCIAL HISTORY:    Smoking Status:No current smoking  Alcohol:Denied    Occupation:Works in school    FAMILY HISTORY:  Maternal family history of cancer (Mother, Sibling): mother rectal ca  sister fallopian tube ca              	        Vital Signs Last 24 Hrs  T(C): 36.9 (10 Mar 2018 11:46), Max: 38.7 (09 Mar 2018 20:11)  T(F): 98.4 (10 Mar 2018 11:46), Max: 101.6 (09 Mar 2018 20:11)  HR: 90 (10 Mar 2018 11:46) (85 - 103)  BP: 127/70 (10 Mar 2018 11:46) (96/64 - 137/74)  BP(mean): --  RR: 18 (10 Mar 2018 11:46) (16 - 18)  SpO2: 97% (10 Mar 2018 11:46) (94% - 97%)    PHYSICAL EXAM:      Constitutional:Awake, alert, comfortable    Eyes:anicteric    ENMT:moist mm's, no lesion    Neck:supple    Breasts:NE (in ER)      Respiratory:Clear    Cardiovascular:RRR, zach S1S2    Gastrointestinal:Soft, non-distended, non-tender    Genitourinary:NE    Rectal:NE    Extremities:No cyanosis or edema      Neurological:Non-focal    Skin:no rash                  LABS:                        8.5    20.0  )-----------( 368      ( 10 Mar 2018 07:16 )             28.9     03-10    139  |  104  |  15.0  ----------------------------<  140<H>  4.1   |  21.0<L>  |  0.55    Ca    8.7      10 Mar 2018 07:16  Phos  2.5     03-10  Mg     1.8     03-10    TPro  5.6<L>  /  Alb  3.0<L>  /  TBili  0.5  /  DBili  x   /  AST  23  /  ALT  16  /  AlkPhos  66  03-10    PT/INR - ( 10 Mar 2018 07:16 )   PT: 12.0 sec;   INR: 1.09 ratio         PTT - ( 10 Mar 2018 07:16 )  PTT:23.6 sec  Urinalysis Basic - ( 09 Mar 2018 21:54 )    Color: Yellow / Appearance: Clear / S.010 / pH: x  Gluc: x / Ketone: Negative  / Bili: Negative / Urobili: Negative mg/dL   Blood: x / Protein: Negative mg/dL / Nitrite: Negative   Leuk Esterase: Moderate / RBC: Negative /HPF / WBC 6-10   Sq Epi: x / Non Sq Epi: Occasional / Bacteria: x        RADIOLOGY & ADDITIONAL STUDIES:

## 2018-03-11 NOTE — PROGRESS NOTE ADULT - SUBJECTIVE AND OBJECTIVE BOX
HEALTH ISSUES - PROBLEM Dx:  Pt is a 69 yo female with a pmh/o HTN, HLD, Breast CA first diagnosed in 2014 and s/p chemo, who went into remission 2015 and now in 2017 was rediagnosed and now with mets to lung, liver, bone, and kidney.     INTERVAL HPI/ OVERNIGHT EVENTS:  MRi results reviewed and pt comfortbale      T(C): 37 (11 Mar 2018 07:47), Max: 37 (11 Mar 2018 07:47)  T(F): 98.6 (11 Mar 2018 07:47), Max: 98.6 (11 Mar 2018 07:47)  HR: 86 (11 Mar 2018 11:48) (62 - 88)  BP: 139/75 (11 Mar 2018 11:48) (102/54 - 163/84)  BP(mean): 89 (11 Mar 2018 11:48) (73 - 89)  RR: 20 (11 Mar 2018 11:48) (16 - 23)  SpO2: 96% (11 Mar 2018 11:48) (93% - 96%)    PHYSICAL EXAM-  GEN: NAD, pleasant  HEENT: normocephalic and atraumatic. EOMI. PERRL.    NECK: Supple.   LUNGS: Clear to auscultation.  HEART: Regular rate and rhythm   ABDOMEN: Soft, nontender, and nondistended.  Positive bowel sounds.    : No CVA tenderness  EXTREMITIES: Without any edema.  MSK: No joint swelling  NEUROLOGIC: Cranial nerves II through XII are grossly intact.  PSYCHIATRIC: Appropriate affect .  SKIN: No rash    LABS:    Assessment and Plan

## 2018-03-11 NOTE — PROGRESS NOTE ADULT - ATTENDING COMMENTS
NSGY Attg:    see above    patient seen and examined    alert  conversant  SANTACRUZ to command    MRI reviewed -- innumberable intracranial mets; largest is 2 cm in left occipital/parietal lobe    A/P:  - no neurosurgical intervention  - supportive care per primary team, onc, rad-onc  - Keppra/Decadron per onc/rad onc  - recall prn
Will Follow

## 2018-03-12 VITALS
OXYGEN SATURATION: 97 % | SYSTOLIC BLOOD PRESSURE: 127 MMHG | HEART RATE: 75 BPM | RESPIRATION RATE: 17 BRPM | DIASTOLIC BLOOD PRESSURE: 66 MMHG

## 2018-03-12 LAB
CULTURE RESULTS: SIGNIFICANT CHANGE UP
SPECIMEN SOURCE: SIGNIFICANT CHANGE UP

## 2018-03-12 PROCEDURE — 83735 ASSAY OF MAGNESIUM: CPT

## 2018-03-12 PROCEDURE — 99233 SBSQ HOSP IP/OBS HIGH 50: CPT

## 2018-03-12 PROCEDURE — 97530 THERAPEUTIC ACTIVITIES: CPT

## 2018-03-12 PROCEDURE — 83605 ASSAY OF LACTIC ACID: CPT

## 2018-03-12 PROCEDURE — 97163 PT EVAL HIGH COMPLEX 45 MIN: CPT

## 2018-03-12 PROCEDURE — 87040 BLOOD CULTURE FOR BACTERIA: CPT

## 2018-03-12 PROCEDURE — 36415 COLL VENOUS BLD VENIPUNCTURE: CPT

## 2018-03-12 PROCEDURE — 85027 COMPLETE CBC AUTOMATED: CPT

## 2018-03-12 PROCEDURE — 87581 M.PNEUMON DNA AMP PROBE: CPT

## 2018-03-12 PROCEDURE — 87798 DETECT AGENT NOS DNA AMP: CPT

## 2018-03-12 PROCEDURE — 94640 AIRWAY INHALATION TREATMENT: CPT

## 2018-03-12 PROCEDURE — 87449 NOS EACH ORGANISM AG IA: CPT

## 2018-03-12 PROCEDURE — 87086 URINE CULTURE/COLONY COUNT: CPT

## 2018-03-12 PROCEDURE — 71045 X-RAY EXAM CHEST 1 VIEW: CPT

## 2018-03-12 PROCEDURE — 93005 ELECTROCARDIOGRAM TRACING: CPT

## 2018-03-12 PROCEDURE — 84145 PROCALCITONIN (PCT): CPT

## 2018-03-12 PROCEDURE — 87486 CHLMYD PNEUM DNA AMP PROBE: CPT

## 2018-03-12 PROCEDURE — 84100 ASSAY OF PHOSPHORUS: CPT

## 2018-03-12 PROCEDURE — 80053 COMPREHEN METABOLIC PANEL: CPT

## 2018-03-12 PROCEDURE — 70553 MRI BRAIN STEM W/O & W/DYE: CPT

## 2018-03-12 PROCEDURE — 81001 URINALYSIS AUTO W/SCOPE: CPT

## 2018-03-12 PROCEDURE — 99231 SBSQ HOSP IP/OBS SF/LOW 25: CPT

## 2018-03-12 PROCEDURE — 80061 LIPID PANEL: CPT

## 2018-03-12 PROCEDURE — 85730 THROMBOPLASTIN TIME PARTIAL: CPT

## 2018-03-12 PROCEDURE — 96374 THER/PROPH/DIAG INJ IV PUSH: CPT

## 2018-03-12 PROCEDURE — 85610 PROTHROMBIN TIME: CPT

## 2018-03-12 PROCEDURE — 99285 EMERGENCY DEPT VISIT HI MDM: CPT | Mod: 25

## 2018-03-12 PROCEDURE — 71250 CT THORAX DX C-: CPT

## 2018-03-12 PROCEDURE — 83036 HEMOGLOBIN GLYCOSYLATED A1C: CPT

## 2018-03-12 PROCEDURE — 87633 RESP VIRUS 12-25 TARGETS: CPT

## 2018-03-12 PROCEDURE — 70450 CT HEAD/BRAIN W/O DYE: CPT

## 2018-03-12 RX ORDER — LEVETIRACETAM 250 MG/1
1 TABLET, FILM COATED ORAL
Qty: 60 | Refills: 0 | OUTPATIENT
Start: 2018-03-12 | End: 2018-04-10

## 2018-03-12 RX ORDER — PANTOPRAZOLE SODIUM 20 MG/1
1 TABLET, DELAYED RELEASE ORAL
Qty: 20 | Refills: 0 | OUTPATIENT
Start: 2018-03-12

## 2018-03-12 RX ORDER — DEXAMETHASONE 0.5 MG/5ML
1 ELIXIR ORAL
Qty: 16 | Refills: 0 | OUTPATIENT
Start: 2018-03-12

## 2018-03-12 RX ORDER — LEVETIRACETAM 250 MG/1
1 TABLET, FILM COATED ORAL
Qty: 0 | Refills: 0 | COMMUNITY
Start: 2018-03-12

## 2018-03-12 RX ORDER — SACCHAROMYCES BOULARDII 250 MG
250 POWDER IN PACKET (EA) ORAL
Qty: 0 | Refills: 0 | Status: DISCONTINUED | OUTPATIENT
Start: 2018-03-12 | End: 2018-03-12

## 2018-03-12 RX ADMIN — Medication 4 MILLIGRAM(S): at 05:19

## 2018-03-12 RX ADMIN — Medication 4 MILLIGRAM(S): at 11:12

## 2018-03-12 RX ADMIN — Medication 50 MILLIGRAM(S): at 05:19

## 2018-03-12 RX ADMIN — CEFEPIME 100 MILLIGRAM(S): 1 INJECTION, POWDER, FOR SOLUTION INTRAMUSCULAR; INTRAVENOUS at 14:00

## 2018-03-12 RX ADMIN — AMLODIPINE BESYLATE 5 MILLIGRAM(S): 2.5 TABLET ORAL at 05:19

## 2018-03-12 RX ADMIN — LEVETIRACETAM 500 MILLIGRAM(S): 250 TABLET, FILM COATED ORAL at 05:19

## 2018-03-12 RX ADMIN — PANTOPRAZOLE SODIUM 40 MILLIGRAM(S): 20 TABLET, DELAYED RELEASE ORAL at 05:19

## 2018-03-12 RX ADMIN — LORATADINE 10 MILLIGRAM(S): 10 TABLET ORAL at 11:11

## 2018-03-12 RX ADMIN — CEFEPIME 100 MILLIGRAM(S): 1 INJECTION, POWDER, FOR SOLUTION INTRAMUSCULAR; INTRAVENOUS at 05:19

## 2018-03-12 NOTE — PROGRESS NOTE ADULT - SUBJECTIVE AND OBJECTIVE BOX
[facility]  INFECTIOUS DISEASES AND INTERNAL MEDICINE at [place]  =======================================================  Zechariah Yung MD  Diplomates American Board of Internal Medicine and Infectious Diseases  =======================================================    DARA JACKSON [id number]    Follow up: SIRS  PT AWAKE AND ALERT NON TOXIC    No complaints  No fevers    Allergies:  No Known Allergies      Antibiotics:  cefepime  IVPB 2000 milliGRAM(s) IV Intermittent every 8 hours      REVIEW OF SYSTEMS:  CONSTITUTIONAL:  + Fever and chills  HEENT:  No diplopia or blurred vision.  No earache, sore throat or runny nose.  CARDIOVASCULAR:  No pressure, squeezing, strangling, tightness, heaviness or aching about the chest, neck, axilla or epigastrium.  RESPIRATORY:  No cough, shortness of breath  GASTROINTESTINAL:  No nausea, vomiting or diarrhea.  GENITOURINARY:  No dysuria, frequency or urgency.  MUSCULOSKELETAL:  no joint aches, no muscle pain  SKIN:  No change in skin, hair or nails.  NEUROLOGIC:  No paresthesias, fasciculations  PSYCHIATRIC:  No disorder of thought or mood.  ENDOCRINE:  No heat or cold intolerance  HEMATOLOGICAL:  No easy bruising or bleeding.       Physical Exam:   Vital Signs Last 24 Hrs  T(C): 37.1 (12 Mar 2018 11:54), Max: 37.1 (11 Mar 2018 16:00)  T(F): 98.8 (12 Mar 2018 11:54), Max: 98.8 (11 Mar 2018 20:00)  HR: 75 (12 Mar 2018 12:00) (73 - 93)  BP: 127/66 (12 Mar 2018 12:00) (123/63 - 150/81)  BP(mean): 81 (11 Mar 2018 16:30) (81 - 81)  RR: 17 (12 Mar 2018 12:00) (15 - 22)  SpO2: 97% (12 Mar 2018 12:00) (95% - 98%)    GEN: NAD, pleasant  HEENT: normocephalic and atraumatic. EOMI. PERRL.    NECK: Supple.   LUNGS: Clear to auscultation.  HEART: Regular rate and rhythm   ABDOMEN: Soft, nontender, and nondistended.  Positive bowel sounds.    : No CVA tenderness  EXTREMITIES: Without any edema.  MSK: No joint swelling  NEUROLOGIC: Cranial nerves II through XII are grossly intact.  PSYCHIATRIC: Appropriate affect .  SKIN: No rash        Labs:       PT/INR - ( 10 Mar 2018 07:16 )   PT: 12.0 sec;   INR: 1.09 ratio         PTT - ( 10 Mar 2018 07:16 )  PTT:23.6 sec  Urinalysis Basic - ( 09 Mar 2018 21:54 )    Color: Yellow / Appearance: Clear / S.010 / pH: x  Gluc: x / Ketone: Negative  / Bili: Negative / Urobili: Negative mg/dL   Blood: x / Protein: Negative mg/dL / Nitrite: Negative   Leuk Esterase: Moderate / RBC: Negative /HPF / WBC 6-10   Sq Epi: x / Non Sq Epi: Occasional / Bacteria: x      LIVER FUNCTIONS - ( 10 Mar 2018 07:16 )  Alb: 3.0 g/dL / Pro: 5.6 g/dL / ALK PHOS: 66 U/L / ALT: 16 U/L / AST: 23 U/L / GGT: x             RECENT CULTURES:   @ 23:22      NotDetec

## 2018-03-12 NOTE — PROGRESS NOTE ADULT - PROBLEM SELECTOR PROBLEM 3
Bilateral malignant neoplasm of breast in female, unspecified estrogen receptor status, unspecified site of breast
Bilateral malignant neoplasm of breast in female, unspecified estrogen receptor status, unspecified site of breast
ISAAK (acute kidney injury)
ISAAK (acute kidney injury)

## 2018-03-12 NOTE — PHYSICAL THERAPY INITIAL EVALUATION ADULT - IMPAIRMENTS CONTRIBUTING TO GAIT DEVIATIONS, PT EVAL
ambulation with change of speed and direction, horizontal head movement/impaired balance/narrow base of support

## 2018-03-12 NOTE — PROGRESS NOTE ADULT - PROBLEM SELECTOR PLAN 2
Patient immunosuppressed due to Chemotherapy
Patient immunosuppressed due to Chemotherapy
as above  ID consult placed  resolved  negative bld c/s results
as above  ID consult placed  resolved  wait final bld c/s results

## 2018-03-12 NOTE — PROGRESS NOTE ADULT - SUBJECTIVE AND OBJECTIVE BOX
[facility]                                                                     NEUROLOGY AT [place]                                                                       Jerel Richards, Michael                                                                      [address]                                                                           [place], [place], [address]                                                                                 [phone]                                                                           Neurology Progress Note        No new complaints.    Awake and alert, fully oriented  Speech, comprehension intact  Pupils =, reactive  Full visual fields and extraocular movements  Face symmetric.  Good strength bilaterally  No drift    Neuro status stable    Breast cancer  Brain mets    Await RT  Seen by Oncology

## 2018-03-12 NOTE — PHYSICAL THERAPY INITIAL EVALUATION ADULT - ADDITIONAL COMMENTS
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   s/p eval standing dynamic balance deficits noted , SAC recommended for outdoors, further education recommended at present time pt. declined use

## 2018-03-12 NOTE — PROGRESS NOTE ADULT - PROBLEM SELECTOR PROBLEM 2
Immunosuppressed due to chemotherapy
Immunosuppressed due to chemotherapy
SIRS due to infectious process without acute organ dysfunction
SIRS due to infectious process without acute organ dysfunction

## 2018-03-12 NOTE — PROGRESS NOTE ADULT - ASSESSMENT
67y/o  Female h/o HTN, HLD, Breast CA first diagnosed in 2014 and s/p chemo, who went into remission 2015 and now in 2017 was re-diagnosed and now with mets to lung, liver, bone, and kidney. Patient started Chemotherapy in October 2017 and last chemotherapy was last week Wednesday. Here with Fever and chills x 1day  PT NON TOXIC ALL CX ARE NEGATIVE  WILL D/W HEMATOLOGY MAY CONSIDER D/C ALL ABX WILL FOLLOW UP

## 2018-03-13 PROBLEM — C50.919 MALIGNANT NEOPLASM OF UNSPECIFIED SITE OF UNSPECIFIED FEMALE BREAST: Chronic | Status: ACTIVE | Noted: 2018-03-09

## 2018-03-13 PROBLEM — Z00.00 ENCOUNTER FOR PREVENTIVE HEALTH EXAMINATION: Status: ACTIVE | Noted: 2018-03-13

## 2018-03-13 PROBLEM — E78.5 HYPERLIPIDEMIA, UNSPECIFIED: Chronic | Status: ACTIVE | Noted: 2018-03-09

## 2018-03-14 LAB
CULTURE RESULTS: SIGNIFICANT CHANGE UP
CULTURE RESULTS: SIGNIFICANT CHANGE UP
SPECIMEN SOURCE: SIGNIFICANT CHANGE UP
SPECIMEN SOURCE: SIGNIFICANT CHANGE UP

## 2018-03-16 ENCOUNTER — OUTPATIENT (OUTPATIENT)
Dept: OUTPATIENT SERVICES | Facility: HOSPITAL | Age: 68
LOS: 1 days | Discharge: HOME | End: 2018-03-16

## 2018-03-16 ENCOUNTER — APPOINTMENT (OUTPATIENT)
Dept: RADIATION ONCOLOGY | Facility: CLINIC | Age: 68
End: 2018-03-16
Payer: MEDICARE

## 2018-03-16 VITALS
RESPIRATION RATE: 16 BRPM | DIASTOLIC BLOOD PRESSURE: 98 MMHG | SYSTOLIC BLOOD PRESSURE: 160 MMHG | OXYGEN SATURATION: 98 % | WEIGHT: 132 LBS | HEART RATE: 83 BPM

## 2018-03-16 DIAGNOSIS — Z86.39 PERSONAL HISTORY OF OTHER ENDOCRINE, NUTRITIONAL AND METABOLIC DISEASE: ICD-10-CM

## 2018-03-16 DIAGNOSIS — Z87.891 PERSONAL HISTORY OF NICOTINE DEPENDENCE: ICD-10-CM

## 2018-03-16 DIAGNOSIS — Z86.79 PERSONAL HISTORY OF OTHER DISEASES OF THE CIRCULATORY SYSTEM: ICD-10-CM

## 2018-03-16 DIAGNOSIS — Z98.82 BREAST IMPLANT STATUS: Chronic | ICD-10-CM

## 2018-03-16 DIAGNOSIS — Z98.890 OTHER SPECIFIED POSTPROCEDURAL STATES: Chronic | ICD-10-CM

## 2018-03-16 PROCEDURE — 99205 OFFICE O/P NEW HI 60 MIN: CPT | Mod: 25

## 2018-03-16 PROCEDURE — 77263 THER RADIOLOGY TX PLNG CPLX: CPT

## 2018-03-16 RX ORDER — DEXAMETHASONE 2 MG/1
2 TABLET ORAL DAILY
Qty: 30 | Refills: 3 | Status: ACTIVE | COMMUNITY
Start: 1900-01-01 | End: 1900-01-01

## 2018-03-16 RX ORDER — CETIRIZINE HCL 10 MG
TABLET ORAL
Refills: 0 | Status: DISCONTINUED | COMMUNITY
End: 2018-03-16

## 2018-03-16 RX ORDER — PANTOPRAZOLE 40 MG/1
40 TABLET, DELAYED RELEASE ORAL
Refills: 0 | Status: DISCONTINUED | COMMUNITY
End: 2018-03-16

## 2018-03-19 PROCEDURE — 77290 THER RAD SIMULAJ FIELD CPLX: CPT | Mod: 26

## 2018-03-19 PROCEDURE — 77334 RADIATION TREATMENT AID(S): CPT | Mod: 26

## 2018-03-20 PROCEDURE — 77307 TELETHX ISODOSE PLAN CPLX: CPT | Mod: 26

## 2018-03-20 PROCEDURE — 77334 RADIATION TREATMENT AID(S): CPT | Mod: 26

## 2018-03-21 PROCEDURE — 77280 THER RAD SIMULAJ FIELD SMPL: CPT | Mod: 26

## 2018-03-26 VITALS
OXYGEN SATURATION: 98 % | HEART RATE: 67 BPM | TEMPERATURE: 98.2 F | DIASTOLIC BLOOD PRESSURE: 97 MMHG | SYSTOLIC BLOOD PRESSURE: 200 MMHG | RESPIRATION RATE: 16 BRPM | WEIGHT: 130.8 LBS

## 2018-03-28 PROCEDURE — 77427 RADIATION TX MANAGEMENT X5: CPT

## 2018-04-02 VITALS
DIASTOLIC BLOOD PRESSURE: 96 MMHG | RESPIRATION RATE: 16 BRPM | SYSTOLIC BLOOD PRESSURE: 204 MMHG | TEMPERATURE: 98 F | HEART RATE: 77 BPM | OXYGEN SATURATION: 98 % | WEIGHT: 135 LBS

## 2018-04-02 DIAGNOSIS — C79.31 MALIGNANT NEOPLASM OF UNSPECIFIED SITE OF UNSPECIFIED FEMALE BREAST: ICD-10-CM

## 2018-04-02 DIAGNOSIS — C50.919 MALIGNANT NEOPLASM OF UNSPECIFIED SITE OF UNSPECIFIED FEMALE BREAST: ICD-10-CM

## 2018-04-04 PROCEDURE — 77427 RADIATION TX MANAGEMENT X5: CPT

## 2018-04-06 ENCOUNTER — LABORATORY RESULT (OUTPATIENT)
Age: 68
End: 2018-04-06

## 2018-04-06 ENCOUNTER — MOBILE ON CALL (OUTPATIENT)
Age: 68
End: 2018-04-06

## 2018-04-06 ENCOUNTER — APPOINTMENT (OUTPATIENT)
Dept: DERMATOLOGY | Facility: CLINIC | Age: 68
End: 2018-04-06
Payer: MEDICARE

## 2018-04-06 VITALS
SYSTOLIC BLOOD PRESSURE: 150 MMHG | DIASTOLIC BLOOD PRESSURE: 90 MMHG | BODY MASS INDEX: 25.91 KG/M2 | WEIGHT: 132 LBS | HEIGHT: 60 IN

## 2018-04-06 DIAGNOSIS — L82.1 OTHER SEBORRHEIC KERATOSIS: ICD-10-CM

## 2018-04-06 DIAGNOSIS — D48.5 NEOPLASM OF UNCERTAIN BEHAVIOR OF SKIN: ICD-10-CM

## 2018-04-06 DIAGNOSIS — D18.01 HEMANGIOMA OF SKIN AND SUBCUTANEOUS TISSUE: ICD-10-CM

## 2018-04-06 PROCEDURE — 11101 BIOPSY SKIN SUBQ&/MUCOUS MEMBRANE EA ADDL LESN: CPT

## 2018-04-06 PROCEDURE — 11100 BX SKIN SUBCUTANEOUS&/MUCOUS MEMBRANE 1 LESION: CPT

## 2018-04-06 PROCEDURE — 99203 OFFICE O/P NEW LOW 30 MIN: CPT | Mod: 25

## 2018-04-10 ENCOUNTER — INPATIENT (INPATIENT)
Facility: HOSPITAL | Age: 68
LOS: 1 days | Discharge: HOME | DRG: 54 | End: 2018-04-12
Attending: INTERNAL MEDICINE | Admitting: INTERNAL MEDICINE
Payer: MEDICARE

## 2018-04-10 VITALS
RESPIRATION RATE: 24 BRPM | DIASTOLIC BLOOD PRESSURE: 59 MMHG | SYSTOLIC BLOOD PRESSURE: 93 MMHG | HEART RATE: 98 BPM | OXYGEN SATURATION: 99 %

## 2018-04-10 DIAGNOSIS — Z98.82 BREAST IMPLANT STATUS: Chronic | ICD-10-CM

## 2018-04-10 DIAGNOSIS — Z98.890 OTHER SPECIFIED POSTPROCEDURAL STATES: Chronic | ICD-10-CM

## 2018-04-10 DIAGNOSIS — R56.9 UNSPECIFIED CONVULSIONS: ICD-10-CM

## 2018-04-10 DIAGNOSIS — R53.81 OTHER MALAISE: ICD-10-CM

## 2018-04-10 DIAGNOSIS — Z51.5 ENCOUNTER FOR PALLIATIVE CARE: ICD-10-CM

## 2018-04-10 DIAGNOSIS — C50.919 MALIGNANT NEOPLASM OF UNSPECIFIED SITE OF UNSPECIFIED FEMALE BREAST: ICD-10-CM

## 2018-04-10 LAB
ALBUMIN SERPL ELPH-MCNC: 3 G/DL — LOW (ref 3.3–5.2)
ALP SERPL-CCNC: 88 U/L — SIGNIFICANT CHANGE UP (ref 40–120)
ALT FLD-CCNC: 22 U/L — SIGNIFICANT CHANGE UP
ANION GAP SERPL CALC-SCNC: 14 MMOL/L — SIGNIFICANT CHANGE UP (ref 5–17)
AST SERPL-CCNC: 23 U/L — SIGNIFICANT CHANGE UP
BASOPHILS # BLD AUTO: 0 K/UL — SIGNIFICANT CHANGE UP (ref 0–0.2)
BASOPHILS NFR BLD AUTO: 0.3 % — SIGNIFICANT CHANGE UP (ref 0–2)
BILIRUB SERPL-MCNC: <0.2 MG/DL — LOW (ref 0.4–2)
BUN SERPL-MCNC: 28 MG/DL — HIGH (ref 8–20)
CALCIUM SERPL-MCNC: 8.9 MG/DL — SIGNIFICANT CHANGE UP (ref 8.6–10.2)
CHLORIDE SERPL-SCNC: 101 MMOL/L — SIGNIFICANT CHANGE UP (ref 98–107)
CO2 SERPL-SCNC: 21 MMOL/L — LOW (ref 22–29)
CREAT SERPL-MCNC: 0.72 MG/DL — SIGNIFICANT CHANGE UP (ref 0.5–1.3)
EOSINOPHIL # BLD AUTO: 0.1 K/UL — SIGNIFICANT CHANGE UP (ref 0–0.5)
EOSINOPHIL NFR BLD AUTO: 0.9 % — SIGNIFICANT CHANGE UP (ref 0–6)
GLUCOSE BLDC GLUCOMTR-MCNC: 157 MG/DL — HIGH (ref 70–99)
GLUCOSE BLDC GLUCOMTR-MCNC: 73 MG/DL — SIGNIFICANT CHANGE UP (ref 70–99)
GLUCOSE SERPL-MCNC: 165 MG/DL — HIGH (ref 70–115)
HCT VFR BLD CALC: 34 % — LOW (ref 37–47)
HGB BLD-MCNC: 10.3 G/DL — LOW (ref 12–16)
LACTATE BLDV-MCNC: 3.8 MMOL/L — HIGH (ref 0.5–2)
LYMPHOCYTES # BLD AUTO: 1 K/UL — SIGNIFICANT CHANGE UP (ref 1–4.8)
LYMPHOCYTES # BLD AUTO: 13.3 % — LOW (ref 20–55)
MCHC RBC-ENTMCNC: 24.7 PG — LOW (ref 27–31)
MCHC RBC-ENTMCNC: 30.3 G/DL — LOW (ref 32–36)
MCV RBC AUTO: 81.5 FL — SIGNIFICANT CHANGE UP (ref 81–99)
MONOCYTES # BLD AUTO: 0.6 K/UL — SIGNIFICANT CHANGE UP (ref 0–0.8)
MONOCYTES NFR BLD AUTO: 8.7 % — SIGNIFICANT CHANGE UP (ref 3–10)
NEUTROPHILS # BLD AUTO: 5.6 K/UL — SIGNIFICANT CHANGE UP (ref 1.8–8)
NEUTROPHILS NFR BLD AUTO: 75.2 % — HIGH (ref 37–73)
PLATELET # BLD AUTO: 224 K/UL — SIGNIFICANT CHANGE UP (ref 150–400)
POTASSIUM SERPL-MCNC: 3.5 MMOL/L — SIGNIFICANT CHANGE UP (ref 3.5–5.3)
POTASSIUM SERPL-SCNC: 3.5 MMOL/L — SIGNIFICANT CHANGE UP (ref 3.5–5.3)
PROT SERPL-MCNC: 5.4 G/DL — LOW (ref 6.6–8.7)
RBC # BLD: 4.17 M/UL — LOW (ref 4.4–5.2)
RBC # FLD: 19.6 % — HIGH (ref 11–15.6)
SODIUM SERPL-SCNC: 136 MMOL/L — SIGNIFICANT CHANGE UP (ref 135–145)
WBC # BLD: 7.4 K/UL — SIGNIFICANT CHANGE UP (ref 4.8–10.8)
WBC # FLD AUTO: 7.4 K/UL — SIGNIFICANT CHANGE UP (ref 4.8–10.8)

## 2018-04-10 PROCEDURE — 99223 1ST HOSP IP/OBS HIGH 75: CPT

## 2018-04-10 PROCEDURE — 99497 ADVNCD CARE PLAN 30 MIN: CPT | Mod: 25

## 2018-04-10 PROCEDURE — 71045 X-RAY EXAM CHEST 1 VIEW: CPT | Mod: 26

## 2018-04-10 PROCEDURE — 99291 CRITICAL CARE FIRST HOUR: CPT

## 2018-04-10 PROCEDURE — 70450 CT HEAD/BRAIN W/O DYE: CPT | Mod: 26

## 2018-04-10 PROCEDURE — 99222 1ST HOSP IP/OBS MODERATE 55: CPT

## 2018-04-10 PROCEDURE — 93010 ELECTROCARDIOGRAM REPORT: CPT

## 2018-04-10 RX ORDER — SODIUM CHLORIDE 9 MG/ML
3 INJECTION INTRAMUSCULAR; INTRAVENOUS; SUBCUTANEOUS ONCE
Qty: 0 | Refills: 0 | Status: COMPLETED | OUTPATIENT
Start: 2018-04-10 | End: 2018-04-10

## 2018-04-10 RX ORDER — GLUCAGON INJECTION, SOLUTION 0.5 MG/.1ML
1 INJECTION, SOLUTION SUBCUTANEOUS ONCE
Qty: 0 | Refills: 0 | Status: DISCONTINUED | OUTPATIENT
Start: 2018-04-10 | End: 2018-04-12

## 2018-04-10 RX ORDER — MORPHINE SULFATE 50 MG/1
2 CAPSULE, EXTENDED RELEASE ORAL EVERY 4 HOURS
Qty: 0 | Refills: 0 | Status: DISCONTINUED | OUTPATIENT
Start: 2018-04-10 | End: 2018-04-12

## 2018-04-10 RX ORDER — DEXTROSE MONOHYDRATE, SODIUM CHLORIDE, AND POTASSIUM CHLORIDE 50; .745; 4.5 G/1000ML; G/1000ML; G/1000ML
1000 INJECTION, SOLUTION INTRAVENOUS
Qty: 0 | Refills: 0 | Status: DISCONTINUED | OUTPATIENT
Start: 2018-04-10 | End: 2018-04-12

## 2018-04-10 RX ORDER — CETIRIZINE HYDROCHLORIDE 10 MG/1
1 TABLET ORAL
Qty: 0 | Refills: 0 | COMMUNITY

## 2018-04-10 RX ORDER — LABETALOL HCL 100 MG
5 TABLET ORAL EVERY 6 HOURS
Qty: 0 | Refills: 0 | Status: DISCONTINUED | OUTPATIENT
Start: 2018-04-10 | End: 2018-04-12

## 2018-04-10 RX ORDER — DEXTROSE 50 % IN WATER 50 %
25 SYRINGE (ML) INTRAVENOUS ONCE
Qty: 0 | Refills: 0 | Status: DISCONTINUED | OUTPATIENT
Start: 2018-04-10 | End: 2018-04-12

## 2018-04-10 RX ORDER — SODIUM CHLORIDE 9 MG/ML
1000 INJECTION, SOLUTION INTRAVENOUS
Qty: 0 | Refills: 0 | Status: DISCONTINUED | OUTPATIENT
Start: 2018-04-10 | End: 2018-04-12

## 2018-04-10 RX ORDER — DEXAMETHASONE 0.5 MG/5ML
4 ELIXIR ORAL EVERY 6 HOURS
Qty: 0 | Refills: 0 | Status: DISCONTINUED | OUTPATIENT
Start: 2018-04-10 | End: 2018-04-12

## 2018-04-10 RX ORDER — PANTOPRAZOLE SODIUM 20 MG/1
40 TABLET, DELAYED RELEASE ORAL DAILY
Qty: 0 | Refills: 0 | Status: DISCONTINUED | OUTPATIENT
Start: 2018-04-10 | End: 2018-04-12

## 2018-04-10 RX ORDER — DEXTROSE 50 % IN WATER 50 %
12.5 SYRINGE (ML) INTRAVENOUS ONCE
Qty: 0 | Refills: 0 | Status: DISCONTINUED | OUTPATIENT
Start: 2018-04-10 | End: 2018-04-12

## 2018-04-10 RX ORDER — LEVETIRACETAM 250 MG/1
500 TABLET, FILM COATED ORAL EVERY 12 HOURS
Qty: 0 | Refills: 0 | Status: DISCONTINUED | OUTPATIENT
Start: 2018-04-10 | End: 2018-04-12

## 2018-04-10 RX ORDER — INSULIN LISPRO 100/ML
VIAL (ML) SUBCUTANEOUS
Qty: 0 | Refills: 0 | Status: DISCONTINUED | OUTPATIENT
Start: 2018-04-10 | End: 2018-04-12

## 2018-04-10 RX ORDER — DEXAMETHASONE 0.5 MG/5ML
8 ELIXIR ORAL ONCE
Qty: 0 | Refills: 0 | Status: COMPLETED | OUTPATIENT
Start: 2018-04-10 | End: 2018-04-10

## 2018-04-10 RX ORDER — LEVETIRACETAM 250 MG/1
1000 TABLET, FILM COATED ORAL ONCE
Qty: 0 | Refills: 0 | Status: COMPLETED | OUTPATIENT
Start: 2018-04-10 | End: 2018-04-10

## 2018-04-10 RX ORDER — DEXTROSE 50 % IN WATER 50 %
1 SYRINGE (ML) INTRAVENOUS ONCE
Qty: 0 | Refills: 0 | Status: DISCONTINUED | OUTPATIENT
Start: 2018-04-10 | End: 2018-04-12

## 2018-04-10 RX ADMIN — Medication 4 MILLIGRAM(S): at 14:39

## 2018-04-10 RX ADMIN — Medication 0.5 MILLIGRAM(S): at 23:17

## 2018-04-10 RX ADMIN — Medication 101.6 MILLIGRAM(S): at 08:11

## 2018-04-10 RX ADMIN — PANTOPRAZOLE SODIUM 40 MILLIGRAM(S): 20 TABLET, DELAYED RELEASE ORAL at 11:57

## 2018-04-10 RX ADMIN — LEVETIRACETAM 400 MILLIGRAM(S): 250 TABLET, FILM COATED ORAL at 08:11

## 2018-04-10 RX ADMIN — Medication 0.5 MILLIGRAM(S): at 12:45

## 2018-04-10 RX ADMIN — Medication 4 MILLIGRAM(S): at 22:27

## 2018-04-10 RX ADMIN — LEVETIRACETAM 420 MILLIGRAM(S): 250 TABLET, FILM COATED ORAL at 18:47

## 2018-04-10 RX ADMIN — Medication 0.5 MILLIGRAM(S): at 14:50

## 2018-04-10 RX ADMIN — Medication 0.5 MILLIGRAM(S): at 17:11

## 2018-04-10 RX ADMIN — SODIUM CHLORIDE 3 MILLILITER(S): 9 INJECTION INTRAMUSCULAR; INTRAVENOUS; SUBCUTANEOUS at 08:10

## 2018-04-10 RX ADMIN — DEXTROSE MONOHYDRATE, SODIUM CHLORIDE, AND POTASSIUM CHLORIDE 100 MILLILITER(S): 50; .745; 4.5 INJECTION, SOLUTION INTRAVENOUS at 11:36

## 2018-04-10 NOTE — ED ADULT NURSE REASSESSMENT NOTE - NS ED NURSE REASSESS COMMENT FT1
MICU pa at the bedside to evaluate pt, pt is still post ictal and not awake.  VSS pt received both IV meds as ordered, bloody drainage coming from right nare where nasal trumpet is placed and was suctioned. will continue to monitor.
code team 1 to bedside
neuro at the bedside dr jose.

## 2018-04-10 NOTE — CONSULT NOTE ADULT - SUBJECTIVE AND OBJECTIVE BOX
[facility]                                        Neurology at [place]                                  Michael Cruz, & Salvador                                      [address]                                           [place], [place], [address]                                                [phone]          HISTORY:  The patient is a 68y Female with breast cancer metastatic to brain.   She is known to me from prior admission.  She had been on keppra but was discontinued 2 weeks ago by oncologist due to reported side effects.   She now presents after seizure at home.   She had generalized convulsion with loss of consciousness. She had another episode in the ambulance.   She received Ativan on arrival.   She is now lethargic.     PAST MEDICAL & SURGICAL HISTORY:  Hyperlipidemia, unspecified hyperlipidemia type  Breast cancer: Mestatiszied to lung/brain  HTN (hypertension)  History of breast augmentation   delivery delivered  H/O mastectomy, bilateral    MEDICATION PRIOR TO ADMISSION:  · 	pantoprazole 40 mg oral delayed release tablet  · 	metoprolol succinate 50 mg oral tablet, extended release: 1 tab(s) orally once a day  · 	amLODIPine 5 mg oral tablet: 1 tab(s) orally once a day  · 	simvastatin 20 mg oral tablet: 1 tab(s) orally once a day (at bedtime)  · 	ZyrTEC 10 mg oral tablet: 1 tab(s) orally once a day  · 	aspirin 81 mg oral tablet: 1 tab(s) orally once a day  *          Decadron being tapered.     Allergies  No Known Allergies    SOCIAL HISTORY:  Former smoker.     FAMILY HISTORY:  Maternal family history of cancer (Sibling): mother rectal ca  sister fallopian tube ca      ROS:  Constitutional: Unobtainable due to patient's condition.   Neuro: Unobtainable due to patient's condition.   Eyes: Unobtainable due to patient's condition.   Ears/nose/throat: Unobtainable due to patient's condition.   Cardiac: Unobtainable due to patient's condition.   Respiratory: Unobtainable due to patient's condition.   GI: Unobtainable due to patient's condition.   : Unobtainable due to patient's condition.   Integumentary: Unobtainable due to patient's condition.   Psych: Unobtainable due to patient's condition.   Heme: Unobtainable due to patient's condition.     Exam:  Vital Signs Last 24 Hrs  T(F): 98 (10 Apr 2018 11:34), Max: 98 (10 Apr 2018 11:34)  HR: 92 (10 Apr 2018 11:34) (92 - 98)  BP: 153/94 (10 Apr 2018 11:34) (93/59 - 153/94)  RR: 20 (10 Apr 2018 11:34) (20 - 24)  SpO2: 100% (10 Apr 2018 11:34) (99% - 100%)  General: NAD.   Carotid bruits absent.     Mental status: Lethargic. Opens eyes to voice/stimuli. Follows only very simple instructions. Non verbal.     Cranial nerves: There is no papilledema. Pupils react symmetrically to light. There is no visual field deficit to confrontation. Tracks with gaze. There is no ptosis. Facial sensation is intact. Facial musculature is symmetric. Palate elevates symmetrically. Tongue is midline.    Motor/Sensory: There is normal bulk and tone.  Symmetric limb movement to stimuli.     Reflexes: 2+ throughout and plantar responses are flexor.    Cerebellar: Cannot be tested.     LABS:  *** (1 point for review)                        10.3   7.4   )-----------( 224      ( 10 Apr 2018 07:48 )             34.0       04-10    136  |  101  |  28.0<H>  ----------------------------<  165<H>  3.5   |  21.0<L>  |  0.72    Ca    8.9      10 Apr 2018 07:48    TPro  5.4<L>  /  Alb  3.0<L>  /  TBili  <0.2<L>  /  DBili  x   /  AST  23  /  ALT  22  /  AlkPhos  88  04-10      RADIOLOGY (Report reviewed and images independently reviewed)  CT head images reviewed (and concur with report): There is no acute pathology. There is edema in the left posterior parietal-occipital lobe. There is also right parietal lytic skull lesion. Both seen on prior study last admission.

## 2018-04-10 NOTE — ED ADULT NURSE NOTE - OBJECTIVE STATEMENT
pt came in s/p seizure for the first time, daughter at the bedside stated she took herself off of keppra (which she was put on due to lesions on the brain) 2 weeks ago.

## 2018-04-10 NOTE — CONSULT NOTE ADULT - ATTENDING COMMENTS
COUNSELING:  Face to face meeting to discuss Advanced Care Planning - Time Spent 25 Minutes.      Thank you for the opportunity to assist with the care of this patient.   [facility] [phone].

## 2018-04-10 NOTE — CONSULT NOTE ADULT - PROBLEM SELECTOR RECOMMENDATION 2
Metastatic breast ca to lung, bone, brain.   Poor prognosis, discussed with family, they are very reasonable.    DNR/DNI  Palliative care consulted.
- on anti epileptics.   - CT consistent with brain mets and vasogenic edema.   - decadron ATC

## 2018-04-10 NOTE — GOALS OF CARE CONVERSATION - PERSONAL ADVANCE DIRECTIVE - CONVERSATION DETAILS
Hospice services explained to patient's daughter and sister. Inpatient hospice options also discussed. Family would like time to discuss goals of care for the patient with other family members before making a decision about hospice. Hospice contact information given to daughter. Hospice to follow up with family tomorrow to review hospice services and goals of care for the patient.  Josef KELLY

## 2018-04-10 NOTE — ED PROVIDER NOTE - PROGRESS NOTE DETAILS
Pt seen and eval by ICU and pt with previous DNR/DNI as per daughter.  New DNR/DNI signed and will admit and have palliative care consult.  Call placed to hospitalist to admit Hospitalist/Dr. Ventura informed of admission

## 2018-04-10 NOTE — CONSULT NOTE ADULT - PROBLEM SELECTOR RECOMMENDATION 4
- met with family - HCP daughter Lindy, and patient's sister with [facility]. Per Lindy, mom was functional and still working but they knew that her cancer was advanced. She was diagnosed with the brain mets a month ago. Per daughter, they had a lot of conversations about this, and mom never wanted any heroic measures, no CPR, no intubation, no feeding tubes. They had filled out Molst at [facility] about a eyar ago. Focus is on comfort only, and things that will improve or impact quality of life. No blood work unless it will change outcomes. They are okay with IV fluids, antiepileptics, and steroids. They are okay with antibiotics should she have a reversible infection. MOLST filled out here. I did discuss hospice with them, they are interested to speak to them. They did express that patient's sister just  at home on hospice recently, and mom had expressed she did not want to die at home. - met with family - HCP daughter Lindy, and patient's sister with [employer]. Per Lindy, mom was functional and still working but they knew that her cancer was advanced. She was diagnosed with the brain mets a month ago. Per daughter, they had a lot of conversations about this, and mom never wanted any heroic measures, no CPR, no intubation, no feeding tubes. They had filled out Molst at [facility] about a eyar ago. Focus is on comfort only, and things that will improve or impact quality of life. No blood work unless it will change outcomes. They are okay with IV fluids, antiepileptics, and steroids. They are okay with antibiotics should she have a reversible infection. MOLST filled out here. I did discuss hospice with them, they are interested to speak to them. They did express that patient's sister just  at home on hospice recently, and mom had expressed she did not want to die at home. They would like to wait 24 hours to see if she wakes up a bit more after this seizure and see how she will be before making any definitive decisions. For now, admit to hospital, comfort measures, and wait and see how she in the next 24 hours. If she remains lethargic and requiring IV symptom management, she may qualify for inpatient hospice.

## 2018-04-10 NOTE — ED ADULT TRIAGE NOTE - CHIEF COMPLAINT QUOTE
patient biba from home after having a seizure at home unknown duration, patient had a seizure witnessed by EMS was given 5mg versed, patient with blood to mouth and sputum, unresponsive at this time. Dr. Winchester called to bedside for eval

## 2018-04-10 NOTE — ED PROVIDER NOTE - OBJECTIVE STATEMENT
67 yo F presents to ED via EMS post-ictal after having reported generalized seizure last ? 30 minutes.  Pt with hx of b/l breast cancer with metastatic disease to the brain.  Pt finished radiotx last week and last chemotx was in March.  According to daughter pt was on Keppra, but stopped it recently secondary to side affects.  No prior hx of seizures before today.

## 2018-04-10 NOTE — CONSULT NOTE ADULT - ASSESSMENT
Patient is a 68y old female pmhx HTN, HLD, Stage 4 Metastatic Breast Cancer with mets to liver, lung, bone and brain presented with complaint of grand mal seizure secondary to brain metastases.  Had a goals of care discussion with patient's daughter (HCP) who has stated that when she received the diagnosis of brain mets that they discussed further goals of care and the patient decided that she would want to be a DNR/DNI, form in chart signed by myself, Dr. Almonte and Dr. Winchester; palliative care consult placed.

## 2018-04-10 NOTE — CONSULT NOTE ADULT - ATTENDING COMMENTS
Pt seen and evaluated by [facility] PA, I have seen and evaluated this patient independently and agree with the above findings: 68y old female pmhx HTN, HLD, Stage 4 Metastatic Breast Cancer with mets to liver, lung, bone and brain presented with seizure. Pt seen and evaluated by [facility] PA, I have seen and evaluated this patient independently and agree with the above findings: 68y old female pmhx HTN, HLD, Stage 4 Metastatic Breast Cancer with mets to liver, lung, bone and brain presented with seizure. Resume Keppra, continue decadron for vasogenic edema around mets, symptom management, prepare for end of life as prognosis is poor. Pt made DNR/I. Palliative care consult. Pt does not require ICU LOC, pt is terminal. Family aware and understanding.

## 2018-04-10 NOTE — CONSULT NOTE ADULT - ASSESSMENT
The patient is a 68y Female with brain metastases from breast cancer now with new  seizure.     Agree with restarting Keppra.   Keep IV for now. Change to PO when tolerates.     Case discussed with Dr Winchester (ER Attending).

## 2018-04-10 NOTE — H&P ADULT - ASSESSMENT
This is a 67 yo woman with a pmh/o HTN, HLD, Breast CA first diagnosed in 2014 and s/p surgery and chemo, who went into remission 2015 and  rediagnosed  in 2017 was with recurrent metastatic cancer with mets to brain. She was recently discharge from hospital on Keppra and decadron taper, brought to ED with new onset seizure.  Per daughter Lindy - HCP, she stopped taking Keppra 2 weeks ago due to side effects, had last RT last Thursday, last chemo March. Today she was noted to have generalized tonic clonic seizure at home and one with EMS, received Versed. In Ed she received Keppra and decadron. At the time of exam patient was sleeping.     A/P    >New onset seizure - admit to medicine   stop taking Keppra 2 weeks ago  start Keppra 500mg IV bid  Decadron 4mg IV q6h  ativan prn   seizure precaution This is a 69 yo woman with a pmh/o HTN, HLD, Breast CA first diagnosed in 2014 and s/p surgery and chemo, who went into remission 2015 and  rediagnosed  in 2017 was with recurrent metastatic cancer with mets to brain. She was recently discharge from hospital on Keppra and decadron taper, brought to ED with new onset seizure.  Per daughter Lindy - HCP, she stopped taking Keppra 2 weeks ago due to side effects, had last RT last Thursday, last chemo March. Today she was noted to have generalized tonic clonic seizure at home and one with EMS, received Versed. In Ed she received Keppra and decadron. CT head No acute intracranial pathology. Left posterior parietal/occipital lobe lesion with vasogenic edema similar to prior most   compatible with a metastatic focus. Right parietal lytic lesion likely  metastatic.    A/P    >New onset seizure - due to brain mets   admit to medicine   stop taking Keppra 2 weeks ago  start Keppra 500mg IV bid  Decadron 4mg IV q6h  ativan prn   seizure precaution   CT headNo acute intracranial pathology. Left posterior parietal/occipital lobe lesion with vasogenic edema similar to prior most   compatible with a metastatic focus. Right parietal lytic lesion likely  metastatic.    >Metastatic breast cancer - s/p radiation last Thursday   last chemo  - march     >HTN  - hold medication for now, once patient is awake, resume home medication    >HLD - resume once awake     >Debility - support     >Goals of care - Had a lengthy discussion with Family - daughter Lindy (HCP), sister and other family members, pt dosen't want any any tube, DNR/DNI, if deteriorate will focus on comfort care.  - Time spent discussing ACP 30minutes   prognosis poor This is a 69 yo woman with a pmh/o HTN, HLD, Breast CA first diagnosed in 2014 and s/p surgery and chemo, who went into remission 2015 and  rediagnosed  in 2017 was with recurrent metastatic cancer with mets to brain. She was recently discharge from hospital on Keppra and decadron taper, brought to ED with new onset seizure.  Per daughter Lindy - HCP, she stopped taking Keppra 2 weeks ago due to side effects, had last RT last Thursday, last chemo March. Today she was noted to have generalized tonic clonic seizure at home and one with EMS, received Versed. In Ed she received Keppra and decadron. CT head No acute intracranial pathology. Left posterior parietal/occipital lobe lesion with vasogenic edema similar to prior most   compatible with a metastatic focus. Right parietal lytic lesion likely  metastatic.    A/P    >New onset seizure - due to brain mets   admit to medicine   stop taking Keppra 2 weeks ago  start Keppra 500mg IV bid  Decadron 4mg IV q6h  ativan prn   seizure precaution   CT headNo acute intracranial pathology. Left posterior parietal/occipital lobe lesion with vasogenic edema similar to prior most   compatible with a metastatic focus. Right parietal lytic lesion likely  metastatic.    >Metastatic breast cancer - s/p radiation last Thursday   last chemo  - march     >HTN  - hold medication for now, once patient is awake, resume home medication    >HLD - resume once awake     >Debility - support     >Goals of care - Had a lengthy discussion with Family - daughter Lindy (HCP), sister and other family members, pt dosen't want any any tube, DNR/DNI, if deteriorate will focus on comfort care.  - Time spent discussing ACP 30minutes   prognosis poor   no blood draw unless necessary This is a 69 yo woman with a pmh/o HTN, HLD, Breast CA first diagnosed in 2014 and s/p surgery and chemo, who went into remission 2015 and  rediagnosed  in 2017 was with recurrent metastatic cancer with mets to brain. She was recently discharge from hospital on Keppra and decadron taper, brought to ED with new onset seizure.  Per daughter Lindy - HCP, she stopped taking Keppra 2 weeks ago due to side effects, had last RT last Thursday, last chemo March. Today she was noted to have generalized tonic clonic seizure at home and one with EMS, received Versed. In Ed she received Keppra and decadron. CT head No acute intracranial pathology. Left posterior parietal/occipital lobe lesion with vasogenic edema similar to prior most   compatible with a metastatic focus. Right parietal lytic lesion likely  metastatic.    A/P    >New onset seizure - due to brain mets   admit to medicine   stop taking Keppra 2 weeks ago  start Keppra 500mg IV bid  Decadron 4mg IV q6h  ativan prn   seizure precaution   CT headNo acute intracranial pathology. Left posterior parietal/occipital lobe lesion with vasogenic edema similar to prior most   compatible with a metastatic focus. Right parietal lytic lesion likely  metastatic.    >Metastatic breast cancer - s/p radiation last Thursday   last chemo  - march     >HTN  - hold medication for now, once patient is awake, resume home medication    >HLD - resume once awake     >Debility - support     >DVT PPX - SCD    >Goals of care - Had a lengthy discussion with Family - daughter Lindy (HCP), sister and other family members, pt dosen't want any any tube, DNR/DNI, if deteriorate will focus on comfort care.  - Time spent discussing ACP 30minutes   prognosis poor   no blood draw unless necessary This is a 67 yo woman with a pmh/o HTN, HLD, Breast CA first diagnosed in 2014 and s/p surgery and chemo, who went into remission 2015 and  rediagnosed  in 2017 was with recurrent metastatic cancer with mets to brain. She was recently discharge from hospital on Keppra and decadron taper, brought to ED with new onset seizure.  Per daughter Lindy - HCP, she stopped taking Keppra 2 weeks ago due to side effects, had last RT last Thursday, last chemo March. Today she was noted to have generalized tonic clonic seizure at home and one with EMS, received Versed. In Ed she received Keppra and decadron. CT head No acute intracranial pathology. Left posterior parietal/occipital lobe lesion with vasogenic edema similar to prior most   compatible with a metastatic focus. Right parietal lytic lesion likely  metastatic.    A/P    >New onset seizure - due to brain mets   admit to medicine   stop taking Keppra 2 weeks ago  start Keppra 500mg IV bid  Decadron 4mg IV q6h  ativan prn   seizure precaution   CT headNo acute intracranial pathology. Left posterior parietal/occipital lobe lesion with vasogenic edema similar to prior most   compatible with a metastatic focus. Right parietal lytic lesion likely  metastatic.    >Metastatic breast cancer - s/p radiation last Thursday   last chemo  - march     >HTN  - hold medication for now, once patient is awake, resume home medication    >HLD - resume once awake     >Debility - support     >DVT PPX - comfort care    >Goals of care - Had a lengthy discussion with Family - daughter Lindy (HCP), sister and other family members, pt dosen't want any any tube, DNR/DNI, if deteriorate will focus on comfort care.  - Time spent discussing ACP 30minutes   prognosis poor   no blood draw unless necessary

## 2018-04-10 NOTE — CONSULT NOTE ADULT - SUBJECTIVE AND OBJECTIVE BOX
Patient is a 68y old female pmhx HTN, HLD, Stage 4 Metastatic Breast Cancer with mets to liver, lung, bone and brain presented with complaint of grand mal seizure this morning.  As per patient's daughter (Lindy May, HCP) she received a phone call from her father this am saying that the patient had seized.  They called 911, the seizure had stopped (lasted approximately 5 minutes) and en route patient had another seizure for which EMS gave benzos.  Upon arrival to ED patient was bleeding from oral cavity secondary to biting tongue and had an episode of emesis.  Patient lethargic, had nasal trumpet placed, given Keppra 1G IVPB, and decadron.  As per patient's daughter the patient's last radiation treatment was 3/29/18 and patient tolerated well.  The brain metastases were found in February for which patient was placed on keppra, patient took herself off keppra about 2 weeks ago secondary to never having had a seizure prior to this am.        PAST MEDICAL & SURGICAL HISTORY:  Hyperlipidemia, unspecified hyperlipidemia type  Breast cancer: Mestatiszied to lung/brain  HTN (hypertension)  History of breast augmentation   delivery delivered  H/O mastectomy, bilateral    Allergies  No Known Allergies    FAMILY HISTORY:  Maternal family history of cancer (Mother, Sibling): mother rectal ca  sister fallopian tube ca      Review of Systems:  Unable to assess secondary to patient being post ictal.       Medications:  Keppra  Decadron      ICU Vital Signs Last 24 Hrs  T(C): --  T(F): --  HR: 98 (10 Apr 2018 07:02) (98 - 98)  BP: 93/59 (10 Apr 2018 07:02) (93/59 - 93/59)  BP(mean): --  ABP: --  ABP(mean): --  RR: 24 (10 Apr 2018 07:02) (24 - 24)  SpO2: 99% (10 Apr 2018 07:02) (99% - 99%)      Vital Signs Last 24 Hrs  T(C): --  T(F): --  HR: 98 (10 Apr 2018 07:02) (98 - 98)  BP: 93/59 (10 Apr 2018 07:02) (93/59 - 93/59)  BP(mean): --  RR: 24 (10 Apr 2018 07:02) (24 - 24)  SpO2: 99% (10 Apr 2018 07:02) (99% - 99%)      LABS:                        10.3   7.4   )-----------( 224      ( 10 Apr 2018 07:48 )             34.0     -10    136  |  101  |  28.0<H>  ----------------------------<  165<H>  3.5   |  21.0<L>  |  0.72    Ca    8.9      10 Apr 2018 07:48    TPro  5.4<L>  /  Alb  3.0<L>  /  TBili  <0.2<L>  /  DBili  x   /  AST  23  /  ALT  22  /  AlkPhos  88  04-10      Vitals during exam:   HR: 98 bpm  BP: 115/60 mmHg  RR: 18  sPO2 99% on NRB.     Physical Examination:    General: Patient laying in bed, lethargic in post ictal state, protecting airway, snoring.     HEENT: Pupils equal, reactive to light.  Symmetric. Nasal trumpet in place. Dried blood at corner of mouth, tip of tongue appears erythematous, unable to examine rest of oral cavity.     PULM: Symmetrical thorax movement upon respiration.  Clear to auscultation bilaterally with transmitted upper airway breath sounds. No significant sputum production.     CVS: Regular rate and rhythm, no murmurs, rubs, or gallops. Chemo port located over right chest.     ABD: Soft, nondistended, nontender, normoactive bowel sounds, no masses.     EXT: No edema, nontender     SKIN: Warm and well perfused, no rashes noted.    NEURO: Lethargic, withdraws to pain.     RADIOLOGY:   < from: CT Head No Cont (04.10.18 @ 07:19) >  Impression: No acute intracranial pathology. Left posterior   parietal/occipital lobe lesion with vasogenic edema similar to prior most   compatible with a metastatic focus. Right parietal lytic lesion likely   metastatic.    < end of copied text >

## 2018-04-10 NOTE — H&P ADULT - NSHPPHYSICALEXAM_GEN_ALL_CORE
ICU Vital Signs Last 24 Hrs  T(C): 36.7 (10 Apr 2018 11:34), Max: 36.7 (10 Apr 2018 11:34)  T(F): 98 (10 Apr 2018 11:34), Max: 98 (10 Apr 2018 11:34)  HR: 92 (10 Apr 2018 11:34) (92 - 98)  BP: 153/94 (10 Apr 2018 11:34) (93/59 - 153/94)  BP(mean): --  ABP: --  ABP(mean): --  RR: 20 (10 Apr 2018 11:34) (20 - 24)  SpO2: 100% (10 Apr 2018 11:34) (99% - 100%)

## 2018-04-10 NOTE — CONSULT NOTE ADULT - SUBJECTIVE AND OBJECTIVE BOX
HPI: 68F with PMH as listed admitted today with generalized seizure. Patient with know metastatic breast cancer, recently diagnosed with brain mets. She is s/p chemo in march and radiation therapy.     PERTINENT PMH REVIEWED: Yes     PAST MEDICAL & SURGICAL HISTORY:  Hyperlipidemia, unspecified hyperlipidemia   Breast cancer: Metastasized to lung/brain  HTN (hypertension)  History of breast augmentation   delivery delivered  H/O mastectomy, bilateral    SOCIAL HISTORY:                                     Admitted from:  home     [facility] - [place] - [phone]     FAMILY HISTORY:  Maternal family history of cancer (Sibling): mother rectal ca  sister fallopian tube ca    Baseline ADLs (prior to admission):  Independent/ Dependent      Allergies    No Known Allergies    Present Symptoms:     Dyspnea: 0 1 2 3   Nausea/Vomiting: Yes No  Anxiety:  Yes No  Depression: Yes No  Fatigue: Yes No  Loss of appetite: Yes No    Pain:             Character-            Duration-            Effect-            Factors-            Frequency-            Location-            Severity-    Review of Systems: Reviewed                     Negative:                     Positive:  Unable to obtain due to poor mentation   All others negative    MEDICATIONS  (STANDING):  dexamethasone  Injectable 4 milliGRAM(s) IV Push every 6 hours  dextrose 5% + sodium chloride 0.9% with potassium chloride 20 mEq/L 1000 milliLiter(s) (100 mL/Hr) IV Continuous <Continuous>  dextrose 5%. 1000 milliLiter(s) (50 mL/Hr) IV Continuous <Continuous>  dextrose 50% Injectable 12.5 Gram(s) IV Push once  dextrose 50% Injectable 25 Gram(s) IV Push once  dextrose 50% Injectable 25 Gram(s) IV Push once  insulin lispro (HumaLOG) corrective regimen sliding scale   SubCutaneous three times a day before meals  levETIRAcetam  IVPB 500 milliGRAM(s) IV Intermittent every 12 hours  pantoprazole  Injectable 40 milliGRAM(s) IV Push daily    MEDICATIONS  (PRN):  dextrose Gel 1 Dose(s) Oral once PRN Blood Glucose LESS THAN 70 milliGRAM(s)/deciliter  glucagon  Injectable 1 milliGRAM(s) IntraMuscular once PRN Glucose LESS THAN 70 milligrams/deciliter  labetalol Injectable 5 milliGRAM(s) IV Push every 6 hours PRN Systolic blood pressure > 160  LORazepam   Injectable 2 milliGRAM(s) IV Push every 4 hours PRN seizure    PHYSICAL EXAM:    Vital Signs Last 24 Hrs  T(C): --  T(F): --  HR: 98 (10 Apr 2018 07:02) (98 - 98)  BP: 93/59 (10 Apr 2018 07:02) (93/59 - 93/59)  BP(mean): --  RR: 24 (10 Apr 2018 07:02) (24 - 24)  SpO2: 99% (10 Apr 2018 07:02) (99% - 99%)    General: alert  oriented x ____ lethargic agitated                  cachexia  nonverbal  coma    Karnofsky:  %    HEENT: normal  dry mouth  ET tube/trach    Lungs: comfortable tachypnea/labored breathing  excessive secretions    CV: normal  tachycardia    GI: normal  distended  tender  no BS               PEG/NG/OG tube  constipation  last BM:     : normal  incontinent  oliguria/anuria  south    MSK: normal  weakness  edema             ambulatory  bedbound/wheelchair bound    Skin: normal  pressure ulcers- Stage_____  no rash    LABS:                      10.3   7.4   )-----------( 224      ( 10 Apr 2018 07:48 )             34.0     04-10    136  |  101  |  28.0<H>  ----------------------------<  165<H>  3.5   |  21.0<L>  |  0.72    Ca    8.9      10 Apr 2018 07:48    TPro  5.4<L>  /  Alb  3.0<L>  /  TBili  <0.2<L>  /  DBili  x   /  AST  23  /  ALT  22  /  AlkPhos  88  04-10    I&O's Summary    RADIOLOGY & ADDITIONAL STUDIES:    < from: CT Head No Cont (04.10.18 @ 07:19) >  No acute intracranial pathology. Left posterior parietal/occipital lobe lesion with vasogenic edema similar to prior most compatible with a metastatic focus. Right parietal lytic lesion likely metastatic.    ADVANCE DIRECTIVES: DNR/I HPI: 68F with PMH as listed admitted today with generalized seizure. Patient with know metastatic breast cancer, recently diagnosed with brain mets. She is s/p chemo in march and radiation therapy. Per daughter she was taken off her anti epileptics about 2 weeks ago, because she was having a lot of side effects, and her doctor took her off.     PERTINENT PMH REVIEWED: Yes     PAST MEDICAL & SURGICAL HISTORY:  Hyperlipidemia, unspecified hyperlipidemia   Breast cancer: Metastasized to lung/brain  HTN (hypertension)  History of breast augmentation   delivery delivered  H/O mastectomy, bilateral    SOCIAL HISTORY:  no EtOH                                    Admitted from:  home     HCP - [place] - [phone]     FAMILY HISTORY:  Maternal family history of cancer (Sibling): mother rectal ca  sister fallopian tube ca    Baseline ADLs (prior to admission):  Independent - was still working     Allergies    No Known Allergies    Present Symptoms:     Dyspnea: 0   Nausea/Vomiting: No  Anxiety:  No  Depression: No  Fatigue: Yes   Loss of appetite: No    Pain: none currently             Character-            Duration-            Effect-            Factors-            Frequency-            Location-            Severity-    Review of Systems: Reviewed                  Unable to obtain due to poor mentation   All others negative    MEDICATIONS  (STANDING):  dexamethasone  Injectable 4 milliGRAM(s) IV Push every 6 hours  dextrose 5% + sodium chloride 0.9% with potassium chloride 20 mEq/L 1000 milliLiter(s) (100 mL/Hr) IV Continuous <Continuous>  dextrose 5%. 1000 milliLiter(s) (50 mL/Hr) IV Continuous <Continuous>  dextrose 50% Injectable 12.5 Gram(s) IV Push once  dextrose 50% Injectable 25 Gram(s) IV Push once  dextrose 50% Injectable 25 Gram(s) IV Push once  insulin lispro (HumaLOG) corrective regimen sliding scale   SubCutaneous three times a day before meals  levETIRAcetam  IVPB 500 milliGRAM(s) IV Intermittent every 12 hours  pantoprazole  Injectable 40 milliGRAM(s) IV Push daily    MEDICATIONS  (PRN):  dextrose Gel 1 Dose(s) Oral once PRN Blood Glucose LESS THAN 70 milliGRAM(s)/deciliter  glucagon  Injectable 1 milliGRAM(s) IntraMuscular once PRN Glucose LESS THAN 70 milligrams/deciliter  labetalol Injectable 5 milliGRAM(s) IV Push every 6 hours PRN Systolic blood pressure > 160  LORazepam   Injectable 2 milliGRAM(s) IV Push every 4 hours PRN seizure    PHYSICAL EXAM:    Vital Signs Last 24 Hrs  T(C): --  T(F): --  HR: 98 (10 Apr 2018 07:02) (98 - 98)  BP: 93/59 (10 Apr 2018 07:02) (93/59 - 93/59)  BP(mean): --  RR: 24 (10 Apr 2018 07:02) (24 - 24)  SpO2: 99% (10 Apr 2018 07:02) (99% - 99%)    General: alert not oriented.     Karnofsky:  30 %    HEENT: normal      Lungs: comfortable    CV: normal      GI: normal     : normal      MSK: weakness     Skin: no rash    LABS:                      10.3   7.4   )-----------( 224      ( 10 Apr 2018 07:48 )             34.0     04-10    136  |  101  |  28.0<H>  ----------------------------<  165<H>  3.5   |  21.0<L>  |  0.72    Ca    8.9      10 Apr 2018 07:48    TPro  5.4<L>  /  Alb  3.0<L>  /  TBili  <0.2<L>  /  DBili  x   /  AST  23  /  ALT  22  /  AlkPhos  88  04-10    I&O's Summary    RADIOLOGY & ADDITIONAL STUDIES:    < from: CT Head No Cont (04.10.18 @ 07:19) >  No acute intracranial pathology. Left posterior parietal/occipital lobe lesion with vasogenic edema similar to prior most compatible with a metastatic focus. Right parietal lytic lesion likely metastatic.    ADVANCE DIRECTIVES: DNR/I HPI: 68F with PMH as listed admitted today with generalized seizure. Patient with know metastatic breast cancer, recently diagnosed with brain mets. She is s/p chemo in march and radiation therapy. Per daughter she was taken off her anti epileptics about 2 weeks ago, because she was having a lot of side effects, and her doctor took her off.     PERTINENT PMH REVIEWED: Yes     PAST MEDICAL & SURGICAL HISTORY:  Hyperlipidemia, unspecified hyperlipidemia   Breast cancer: Metastasized to lung/brain  HTN (hypertension)  History of breast augmentation   delivery delivered  H/O mastectomy, bilateral    SOCIAL HISTORY:  no EtOH                                    Admitted from:  home     HCP - [place], daughter - [phone]     FAMILY HISTORY:  Maternal family history of cancer (Sibling): mother rectal ca  sister fallopian tube ca    Baseline ADLs (prior to admission):  Independent - was still working     Allergies    No Known Allergies    Present Symptoms:     Dyspnea: 0   Nausea/Vomiting: No  Anxiety:  No  Depression: No  Fatigue: Yes   Loss of appetite: No    Pain: none currently             Character-            Duration-            Effect-            Factors-            Frequency-            Location-            Severity-    Review of Systems: Reviewed                  Unable to obtain due to poor mentation   All others negative    MEDICATIONS  (STANDING):  dexamethasone  Injectable 4 milliGRAM(s) IV Push every 6 hours  dextrose 5% + sodium chloride 0.9% with potassium chloride 20 mEq/L 1000 milliLiter(s) (100 mL/Hr) IV Continuous <Continuous>  dextrose 5%. 1000 milliLiter(s) (50 mL/Hr) IV Continuous <Continuous>  dextrose 50% Injectable 12.5 Gram(s) IV Push once  dextrose 50% Injectable 25 Gram(s) IV Push once  dextrose 50% Injectable 25 Gram(s) IV Push once  insulin lispro (HumaLOG) corrective regimen sliding scale   SubCutaneous three times a day before meals  levETIRAcetam  IVPB 500 milliGRAM(s) IV Intermittent every 12 hours  pantoprazole  Injectable 40 milliGRAM(s) IV Push daily    MEDICATIONS  (PRN):  dextrose Gel 1 Dose(s) Oral once PRN Blood Glucose LESS THAN 70 milliGRAM(s)/deciliter  glucagon  Injectable 1 milliGRAM(s) IntraMuscular once PRN Glucose LESS THAN 70 milligrams/deciliter  labetalol Injectable 5 milliGRAM(s) IV Push every 6 hours PRN Systolic blood pressure > 160  LORazepam   Injectable 2 milliGRAM(s) IV Push every 4 hours PRN seizure    PHYSICAL EXAM:    Vital Signs Last 24 Hrs  T(C): --  T(F): --  HR: 98 (10 Apr 2018 07:02) (98 - 98)  BP: 93/59 (10 Apr 2018 07:02) (93/59 - 93/59)  BP(mean): --  RR: 24 (10 Apr 2018 07:02) (24 - 24)  SpO2: 99% (10 Apr 2018 07:02) (99% - 99%)    General: alert not oriented.     Karnofsky:  30 %    HEENT: normal      Lungs: comfortable    CV: normal      GI: normal     : normal      MSK: weakness     Skin: no rash    LABS:                      10.3   7.4   )-----------( 224      ( 10 Apr 2018 07:48 )             34.0     04-10    136  |  101  |  28.0<H>  ----------------------------<  165<H>  3.5   |  21.0<L>  |  0.72    Ca    8.9      10 Apr 2018 07:48    TPro  5.4<L>  /  Alb  3.0<L>  /  TBili  <0.2<L>  /  DBili  x   /  AST  23  /  ALT  22  /  AlkPhos  88  04-10    I&O's Summary    RADIOLOGY & ADDITIONAL STUDIES:    < from: CT Head No Cont (04.10.18 @ 07:19) >  No acute intracranial pathology. Left posterior parietal/occipital lobe lesion with vasogenic edema similar to prior most compatible with a metastatic focus. Right parietal lytic lesion likely metastatic.    ADVANCE DIRECTIVES: DNR/I

## 2018-04-10 NOTE — CONSULT NOTE ADULT - PROBLEM SELECTOR RECOMMENDATION 3
- per daughter she was completely functional and went to work yesterday, now she is post ictal, and we have to wait to see how she does.

## 2018-04-10 NOTE — H&P ADULT - HISTORY OF PRESENT ILLNESS
This is a 67 yo woman with a pmh/o HTN, HLD, Breast CA first diagnosed in 2014 and s/p surgery and chemo, who went into remission 2015 and  rediagnosed  in 2017 was with recurrent metastatic cancer with mets to brain. She was recently discharge from hospital on Keppra and decadron taper, brought to ED with new onset seizure.  Per daughter Lindy - HCP, she stopped taking Keppra 2 weeks ago due to side effects, had last RT last Thursday, last chemo March. Today she was noted to have generalized tonic clonic seizure at home and one with EMS, received Versed. In Ed she received Keppra and decadron. At the time of exam patient was sleeping.     Per daughter no h/o fever, chills, abd. pain, nausea, vomiting, chest pain, SOB.     Had a lengthy discussion with Family - daughter Lindy (HCP), sister and other family members, pt dosen't want any any tube, DNR/DNI, if deteriorate will focus on comfort care.

## 2018-04-10 NOTE — ED PROVIDER NOTE - ENMT, MLM
Airway patent, Nasal mucosa clear. Mouth with normal mucosa. bleeding from mouth, unable to visualize tongue

## 2018-04-11 DIAGNOSIS — E78.5 HYPERLIPIDEMIA, UNSPECIFIED: ICD-10-CM

## 2018-04-11 DIAGNOSIS — I10 ESSENTIAL (PRIMARY) HYPERTENSION: ICD-10-CM

## 2018-04-11 DIAGNOSIS — C50.919 MALIGNANT NEOPLASM OF UNSPECIFIED SITE OF UNSPECIFIED FEMALE BREAST: ICD-10-CM

## 2018-04-11 LAB
GLUCOSE BLDC GLUCOMTR-MCNC: 127 MG/DL — HIGH (ref 70–99)
GLUCOSE BLDC GLUCOMTR-MCNC: 133 MG/DL — HIGH (ref 70–99)
GLUCOSE BLDC GLUCOMTR-MCNC: 175 MG/DL — HIGH (ref 70–99)

## 2018-04-11 PROCEDURE — 99233 SBSQ HOSP IP/OBS HIGH 50: CPT

## 2018-04-11 PROCEDURE — 99231 SBSQ HOSP IP/OBS SF/LOW 25: CPT

## 2018-04-11 RX ADMIN — Medication 2: at 17:33

## 2018-04-11 RX ADMIN — DEXTROSE MONOHYDRATE, SODIUM CHLORIDE, AND POTASSIUM CHLORIDE 100 MILLILITER(S): 50; .745; 4.5 INJECTION, SOLUTION INTRAVENOUS at 14:40

## 2018-04-11 RX ADMIN — Medication 4 MILLIGRAM(S): at 05:46

## 2018-04-11 RX ADMIN — PANTOPRAZOLE SODIUM 40 MILLIGRAM(S): 20 TABLET, DELAYED RELEASE ORAL at 13:31

## 2018-04-11 RX ADMIN — Medication 4 MILLIGRAM(S): at 21:50

## 2018-04-11 RX ADMIN — MORPHINE SULFATE 2 MILLIGRAM(S): 50 CAPSULE, EXTENDED RELEASE ORAL at 05:46

## 2018-04-11 RX ADMIN — Medication 4 MILLIGRAM(S): at 05:07

## 2018-04-11 RX ADMIN — LEVETIRACETAM 420 MILLIGRAM(S): 250 TABLET, FILM COATED ORAL at 06:39

## 2018-04-11 RX ADMIN — Medication 0.5 MILLIGRAM(S): at 03:21

## 2018-04-11 RX ADMIN — Medication 4 MILLIGRAM(S): at 15:02

## 2018-04-11 RX ADMIN — MORPHINE SULFATE 2 MILLIGRAM(S): 50 CAPSULE, EXTENDED RELEASE ORAL at 06:39

## 2018-04-11 RX ADMIN — LEVETIRACETAM 420 MILLIGRAM(S): 250 TABLET, FILM COATED ORAL at 18:29

## 2018-04-11 RX ADMIN — DEXTROSE MONOHYDRATE, SODIUM CHLORIDE, AND POTASSIUM CHLORIDE 100 MILLILITER(S): 50; .745; 4.5 INJECTION, SOLUTION INTRAVENOUS at 01:10

## 2018-04-11 NOTE — PROGRESS NOTE ADULT - PROBLEM SELECTOR PLAN 2
Palliative care  met with son this am @ beside to provide additional support and to discuss Hospice liaison options for inpatient care.

## 2018-04-11 NOTE — PROGRESS NOTE ADULT - SUBJECTIVE AND OBJECTIVE BOX
OVERNIGHT EVENTS:    BRIEF HOSPITAL COURSE:    Present Symptoms:     Dyspnea: 0 1 2 3   Nausea/Vomiting: Yes No  Anxiety:  Yes No  Depression: Yes No  Fatigue: Yes No  Loss of appetite: Yes No    Pain:             Character-            Duration-            Effect-            Factors-            Frequency-            Location-            Severity-    Review of Systems: Reviewed                     Negative:                     Positive:  Unable to obtain due to poor mentation   All others negative    MEDICATIONS  (STANDING):  dexamethasone  Injectable 4 milliGRAM(s) IV Push every 6 hours  dextrose 5% + sodium chloride 0.9% with potassium chloride 20 mEq/L 1000 milliLiter(s) (100 mL/Hr) IV Continuous <Continuous>  dextrose 5%. 1000 milliLiter(s) (50 mL/Hr) IV Continuous <Continuous>  dextrose 50% Injectable 12.5 Gram(s) IV Push once  dextrose 50% Injectable 25 Gram(s) IV Push once  dextrose 50% Injectable 25 Gram(s) IV Push once  insulin lispro (HumaLOG) corrective regimen sliding scale   SubCutaneous three times a day before meals  levETIRAcetam  IVPB 500 milliGRAM(s) IV Intermittent every 12 hours  pantoprazole  Injectable 40 milliGRAM(s) IV Push daily    MEDICATIONS  (PRN):  dextrose Gel 1 Dose(s) Oral once PRN Blood Glucose LESS THAN 70 milliGRAM(s)/deciliter  glucagon  Injectable 1 milliGRAM(s) IntraMuscular once PRN Glucose LESS THAN 70 milligrams/deciliter  labetalol Injectable 5 milliGRAM(s) IV Push every 6 hours PRN Systolic blood pressure > 160  LORazepam   Injectable 2 milliGRAM(s) IV Push every 4 hours PRN seizure  LORazepam   Injectable 0.5 milliGRAM(s) IV Push every 2 hours PRN anxiety or agitation  morphine  - Injectable 2 milliGRAM(s) IV Push every 4 hours PRN dyspnea or pain      PHYSICAL EXAM:    Vital Signs Last 24 Hrs  T(C): 36.4 (11 Apr 2018 07:52), Max: 36.9 (11 Apr 2018 04:46)  T(F): 97.5 (11 Apr 2018 07:52), Max: 98.4 (11 Apr 2018 04:46)  HR: 81 (11 Apr 2018 07:52) (77 - 88)  BP: 159/78 (11 Apr 2018 07:52) (159/78 - 166/88)  BP(mean): --  RR: 17 (11 Apr 2018 07:52) (17 - 20)  SpO2: 94% (11 Apr 2018 07:52) (94% - 97%)    General: alert  oriented x ____ lethargic agitated                  cachexia  nonverbal  coma    Karnofsky:  %    HEENT: normal  dry mouth  ET tube/trach    Lungs: comfortable tachypnea/labored breathing  excessive secretions    CV: normal  tachycardia    GI: normal  distended  tender  no BS               PEG/NG/OG tube  constipation  last BM:     : normal  incontinent  oliguria/anuria  south    MSK: normal  weakness  edema             ambulatory  bedbound/wheelchair bound    Skin: normal  pressure ulcers- Stage_____  no rash    LABS:                          10.3   7.4   )-----------( 224      ( 10 Apr 2018 07:48 )             34.0     04-10    136  |  101  |  28.0<H>  ----------------------------<  165<H>  3.5   |  21.0<L>  |  0.72    Ca    8.9      10 Apr 2018 07:48    TPro  5.4<L>  /  Alb  3.0<L>  /  TBili  <0.2<L>  /  DBili  x   /  AST  23  /  ALT  22  /  AlkPhos  88  04-10        I&O's Summary    10 Apr 2018 07:01  -  11 Apr 2018 07:00  --------------------------------------------------------  IN: 100 mL / OUT: 0 mL / NET: 100 mL        RADIOLOGY & ADDITIONAL STUDIES:    ADVANCE DIRECTIVES:   DNR YES NO  Completed on:                     MOLST  YES NO   Completed on:  Living Will  YES NO   Completed on: OVERNIGHT EVENTS: 68F with metastatic cancer to brain admitted with seizure, now much improved, awake, alert, and eating, aware of all her surroundings. no further seizure episodes     Present Symptoms:     Dyspnea: 0  Nausea/Vomiting: No  Anxiety:  No  Depression: No  Fatigue: No  Loss of appetite: No    Pain: none             Character-            Duration-            Effect-            Factors-            Frequency-            Location-            Severity-    Review of Systems: Reviewed                     Negative: no chest pain  All others negative    MEDICATIONS  (STANDING):  dexamethasone  Injectable 4 milliGRAM(s) IV Push every 6 hours  dextrose 5% + sodium chloride 0.9% with potassium chloride 20 mEq/L 1000 milliLiter(s) (100 mL/Hr) IV Continuous <Continuous>  dextrose 5%. 1000 milliLiter(s) (50 mL/Hr) IV Continuous <Continuous>  dextrose 50% Injectable 12.5 Gram(s) IV Push once  dextrose 50% Injectable 25 Gram(s) IV Push once  dextrose 50% Injectable 25 Gram(s) IV Push once  insulin lispro (HumaLOG) corrective regimen sliding scale   SubCutaneous three times a day before meals  levETIRAcetam  IVPB 500 milliGRAM(s) IV Intermittent every 12 hours  pantoprazole  Injectable 40 milliGRAM(s) IV Push daily    MEDICATIONS  (PRN):  dextrose Gel 1 Dose(s) Oral once PRN Blood Glucose LESS THAN 70 milliGRAM(s)/deciliter  glucagon  Injectable 1 milliGRAM(s) IntraMuscular once PRN Glucose LESS THAN 70 milligrams/deciliter  labetalol Injectable 5 milliGRAM(s) IV Push every 6 hours PRN Systolic blood pressure > 160  LORazepam   Injectable 2 milliGRAM(s) IV Push every 4 hours PRN seizure  LORazepam   Injectable 0.5 milliGRAM(s) IV Push every 2 hours PRN anxiety or agitation  morphine  - Injectable 2 milliGRAM(s) IV Push every 4 hours PRN dyspnea or pain    PHYSICAL EXAM:    Vital Signs Last 24 Hrs  T(C): 36.4 (11 Apr 2018 07:52), Max: 36.9 (11 Apr 2018 04:46)  T(F): 97.5 (11 Apr 2018 07:52), Max: 98.4 (11 Apr 2018 04:46)  HR: 81 (11 Apr 2018 07:52) (77 - 88)  BP: 159/78 (11 Apr 2018 07:52) (159/78 - 166/88)  BP(mean): --  RR: 17 (11 Apr 2018 07:52) (17 - 20)  SpO2: 94% (11 Apr 2018 07:52) (94% - 97%)    General: alert  oriented x 3     Karnofsky:  40 %    HEENT: normal     Lungs: comfortable    CV: normal      GI: normal     : normal     MSK: weakness      Skin:  no rash    LABS:                      10.3   7.4   )-----------( 224      ( 10 Apr 2018 07:48 )             34.0     04-10    136  |  101  |  28.0<H>  ----------------------------<  165<H>  3.5   |  21.0<L>  |  0.72    Ca    8.9      10 Apr 2018 07:48    TPro  5.4<L>  /  Alb  3.0<L>  /  TBili  <0.2<L>  /  DBili  x   /  AST  23  /  ALT  22  /  AlkPhos  88  04-10    I&O's Summary    10 Apr 2018 07:01  -  11 Apr 2018 07:00  --------------------------------------------------------  IN: 100 mL / OUT: 0 mL / NET: 100 mL    RADIOLOGY & ADDITIONAL STUDIES:    ADVANCE DIRECTIVES: DNR/I

## 2018-04-11 NOTE — PROGRESS NOTE ADULT - PROBLEM SELECTOR PLAN 4
Continue Keppra as directed continue with providers' plan of treatment to keep patient comfortable. diet as tolerated.

## 2018-04-11 NOTE — PROGRESS NOTE ADULT - SUBJECTIVE AND OBJECTIVE BOX
[facility]                                     Ne[facility] [place]                                 Michael Cruz, & Salvador                                  [address]                                        [place], [place], [address]                                             [phone]      CC/HPI: admitted for seizure activity    Interval HPI: keppra restarted, no seizure activity, much more alert today    dexamethasone  Injectable 4 milliGRAM(s) IV Push every 6 hours  dextrose 5% + sodium chloride 0.9% with potassium chloride 20 mEq/L 1000 milliLiter(s) IV Continuous <Continuous>  dextrose 5%. 1000 milliLiter(s) IV Continuous <Continuous>  dextrose 50% Injectable 12.5 Gram(s) IV Push once  dextrose 50% Injectable 25 Gram(s) IV Push once  dextrose 50% Injectable 25 Gram(s) IV Push once  dextrose Gel 1 Dose(s) Oral once PRN  glucagon  Injectable 1 milliGRAM(s) IntraMuscular once PRN  insulin lispro (HumaLOG) corrective regimen sliding scale   SubCutaneous three times a day before meals  labetalol Injectable 5 milliGRAM(s) IV Push every 6 hours PRN  levETIRAcetam  IVPB 500 milliGRAM(s) IV Intermittent every 12 hours  LORazepam   Injectable 2 milliGRAM(s) IV Push every 4 hours PRN  LORazepam   Injectable 0.5 milliGRAM(s) IV Push every 2 hours PRN  morphine  - Injectable 2 milliGRAM(s) IV Push every 4 hours PRN  pantoprazole  Injectable 40 milliGRAM(s) IV Push daily      ROS:  Neuro: Denies headache, seizure, diplopia, weakness, numbness/paresthesia      Vital signs:  Vital Signs Last 24 Hrs  T(C): 37 (11 Apr 2018 15:59), Max: 37 (11 Apr 2018 15:59)  T(F): 98.6 (11 Apr 2018 15:59), Max: 98.6 (11 Apr 2018 15:59)  HR: 90 (11 Apr 2018 15:59) (77 - 90)  BP: 141/81 (11 Apr 2018 15:59) (141/81 - 166/88)  BP(mean): --  RR: 18 (11 Apr 2018 15:59) (17 - 20)  SpO2: 97% (11 Apr 2018 15:59) (94% - 97%)    Detailed Neurologic Exam:    Mental status: The patient is awake and alert.  The patient is fully oriented in 3 spheres.  The patient is able to name objects, follow commands, repeat sentences.    Cranial nerves:  Pupils equal and react symmetrically to light. There is no visual field deficit to confrontation. Extraocular motion is full with no nystagmus. There is no ptosis. Facial sensation is intact. Facial musculature is symmetric. Palate elevates symmetrically. Shoulder shrug is normal. Tongue is midline.    Motor: There is normal bulk and tone.  There is no tremor.  Strength is 5/5 in the right arm and leg.   Strength is 5/5 in the left arm and leg.    Sensation: Intact to light touch and pin in 4 extremities    Reflexes: 1+ throughout and plantar responses are flexor.    Cerebellar: There is no dysmetria on finger to nose testing.    Gait : deferred    Labs:                        10.3   7.4   )-----------( 224      ( 10 Apr 2018 07:48 )             34.0       04-10    136  |  101  |  28.0<H>  ----------------------------<  165<H>  3.5   |  21.0<L>  |  0.72    Ca    8.9      10 Apr 2018 07:48    TPro  5.4<L>  /  Alb  3.0<L>  /  TBili  <0.2<L>  /  DBili  x   /  AST  23  /  ALT  22  /  AlkPhos  88  04-10      Radiology (studies independently reviewed unless otherwise noted):    no recent studies

## 2018-04-11 NOTE — PROGRESS NOTE ADULT - ATTENDING COMMENTS
Thank you for the opportunity to assist with the care of this patient.   [facility] Consult [facility] [phone].
Pt is sleeping comfortably, family bedside, comfort care only  c/w IV Keppra, decadron, PPI  seizure precaution   Hospice eval pending   prognosis poor  DNR

## 2018-04-11 NOTE — PROGRESS NOTE ADULT - ASSESSMENT
The patient was seen and evaluated while she was lying supine in bed. Her Son was at the bedside. I found the patient awake and alert and in NAD. Pt states she has no complaints and she feels well. She denies and pain or discomfort at this time. she is smiling and receptive for me performing a focused exam on her. The patient feels like eating and requests a cup of hot coffee.
The patient is a 68y Female who is followed by neurology because of brain metsastasis and seizure after keppra was d/c'ed    Keppra restarted  seziure free  stressed to her the need for keppra    will follow with you    Allen Rodriguez MD PhD   [id number]
68F with metastatic breast cancer to brain, admitted with seizures, now clinically improved.
Pt states she has no complaints and she feels well. She denies and pain or discomfort at this time. she is smiling and receptive for me performing a focused exam on her. The patient feels like eating and requests a cup of hot coffee.    Physical Examination: 4/11/2018    General: Patient laying in bed, alert, responsive, alert and oriented X3, in NAD     HEENT: Normocephalic, atraumatic.  Pupils equal, round & reactive to light.    NECK : Supple, no restriction od movement. No JVD  PULM: Symmetrical thorax movement upon respiration.  Clear to auscultation bilaterally with transmitted upper airway breath sounds.   LUNGS: Clear bilat on auscultation, no rales rhonchi wheezing  CVS: Regular rate and rhythm, no murmurs, rubs, or gallops. Chemo port located over right chest.   ABD: Soft, nondistended, nontender, normoactive bowel sounds, no masses.   EXT: No edema, no cyanosis, nontender peripheral pulses 2+ all areas upper and lowers.  SKIN: Warm and well perfused, no rashes noted.  NEURO: No acute deficits noted on gross exam of cranials 2-12

## 2018-04-11 NOTE — PROGRESS NOTE ADULT - PROBLEM SELECTOR PLAN 3
keep Pt comfortable and pain free Palliative care  met with son this am @ beside to provide additional support and to discuss Hospice liaison options for Hospice  prognosis poor

## 2018-04-11 NOTE — PROGRESS NOTE ADULT - SUBJECTIVE AND OBJECTIVE BOX
DARA JACKSON Patient is a 68y old  Female who presents with a chief complaint of New onset seizure (10 Apr 2018 12:04)     HPI:  This is a 69 yo woman with a pmh/o HTN, HLD, Breast CA first diagnosed in  and s/p surgery and chemo, who went into remission  and  rediagnosed  in 2017 was with recurrent metastatic cancer with mets to brain. She was recently discharge from hospital on Keppra and decadron taper, brought to ED with new onset seizure.  Per daughter Lindy - ROSA, she stopped taking Keppra 2 weeks ago due to side effects, had last RT last Thursday, last chemo March. Today she was noted to have generalized tonic clonic seizure at home and one with EMS, received Versed. In Ed she received Keppra and decadron. At the time of exam patient was sleeping.     Per daughter no h/o fever, chills, abd. pain, nausea, vomiting, chest pain, SOB.               Height (cm): 152.4 (04-10 @ 13:25)  Weight (kg): 61.689 (04-10 @ 13:25)  BMI (kg/m2): 26.6 (04-10 @ 13:25)  BSA (m2): 1.58 (04-10 @ 13:25)I&O's Summary    10 Apr 2018 07:01  -  2018 07:00  --------------------------------------------------------  IN: 100 mL / OUT: 0 mL / NET: 100 mL      Allergies    No Known Allergies            HEALTH ISSUES - PROBLEM Dx:  Palliative care encounter: Palliative care encounter  Debility: Debility  Metastatic breast cancer: Metastatic breast cancer  New onset seizure: New onset seizure        PAST MEDICAL & SURGICAL HISTORY:  Hyperlipidemia, unspecified hyperlipidemia type  Breast cancer: Mestatiszied to lung/brain  HTN (hypertension)  History of breast augmentation   delivery delivered  H/O mastectomy, bilateral          Vital Signs Last 24 Hrs  T(C): 36.4 (2018 07:52), Max: 36.9 (2018 04:46)  T(F): 97.5 (2018 07:52), Max: 98.4 (2018 04:46)  HR: 81 (2018 07:52) (77 - 88)  BP: 159/78 (2018 07:52) (150/70 - 166/88)  BP(mean): --  RR: 17 (2018 07:52) (17 - 20)  SpO2: 94% (2018 07:52) (94% - 97%)T(C): 36.4 (18 @ 07:52), Max: 36.9 (18 @ 04:46)  HR: 81 (18 @ 07:52) (77 - 88)  BP: 159/78 (18 @ 07:52) (150/70 - 166/88)  RR: 17 (18 @ 07:52) (17 - 20)  SpO2: 94% (18 @ 07:52) (94% - 97%)  Wt(kg): --    PHYSICAL EXAM:    GENERAL: NAD, well-groomed, well-developed  HEAD:  Atraumatic, Normocephalic  EYES: EOMI, PERRLA, conjunctiva and sclera clear  ENMT:  Moist mucous membranes,  No lesions  NECK: Supple, No JVD, Normal thyroid  NERVOUS SYSTEM:  Alert & Oriented X3,  Moves upper and lower extremities; DTRs 2+ intact and symmetric  CHEST/LUNG: Clear to auscultation bilaterally; No rales, rhonchi, wheezing,   HEART: Regular rate and rhythm; No murmurs,   ABDOMEN: Soft, Nontender, Nondistended; Bowel sounds present  EXTREMITIES:  Peripheral Pulses, No  cyanosis, or edema  SKIN: No rashes or lesions    dexamethasone  Injectable 4 milliGRAM(s) IV Push every 6 hours  dextrose 5% + sodium chloride 0.9% with potassium chloride 20 mEq/L 1000 milliLiter(s) IV Continuous <Continuous>  dextrose 5%. 1000 milliLiter(s) IV Continuous <Continuous>  dextrose 50% Injectable 12.5 Gram(s) IV Push once  dextrose 50% Injectable 25 Gram(s) IV Push once  dextrose 50% Injectable 25 Gram(s) IV Push once  dextrose Gel 1 Dose(s) Oral once PRN  glucagon  Injectable 1 milliGRAM(s) IntraMuscular once PRN  insulin lispro (HumaLOG) corrective regimen sliding scale   SubCutaneous three times a day before meals  labetalol Injectable 5 milliGRAM(s) IV Push every 6 hours PRN  levETIRAcetam  IVPB 500 milliGRAM(s) IV Intermittent every 12 hours  LORazepam   Injectable 2 milliGRAM(s) IV Push every 4 hours PRN  LORazepam   Injectable 0.5 milliGRAM(s) IV Push every 2 hours PRN  morphine  - Injectable 2 milliGRAM(s) IV Push every 4 hours PRN  pantoprazole  Injectable 40 milliGRAM(s) IV Push daily      LABS:                          10.3   7.4   )-----------( 224      ( 10 Apr 2018 07:48 )             34.0     04-10    136  |  101  |  28.0<H>  ----------------------------<  165<H>  3.5   |  21.0<L>  |  0.72    Ca    8.9      10 Apr 2018 07:48    TPro  5.4<L>  /  Alb  3.0<L>  /  TBili  <0.2<L>  /  DBili  x   /  AST  23  /  ALT  22  /  AlkPhos  88  04-10    LIVER FUNCTIONS - ( 10 Apr 2018 07:48 )  Alb: 3.0 g/dL / Pro: 5.4 g/dL / ALK PHOS: 88 U/L / ALT: 22 U/L / AST: 23 U/L / GGT: x                   CAPILLARY BLOOD GLUCOSE      POCT Blood Glucose.: 127 mg/dL (2018 07:54)  POCT Blood Glucose.: 133 mg/dL (2018 05:53)  POCT Blood Glucose.: 157 mg/dL (10 Apr 2018 18:14)      RADIOLOGY & ADDITIONAL TESTS:      Consultant notes reviewed    Case discussed with consultant/provider/ family /patient

## 2018-04-11 NOTE — PROGRESS NOTE ADULT - PROBLEM SELECTOR PLAN 4
- met with patient and then her daughter Lindy separately outside the room along with son Tristan and DU Sellers. Patient is much more alert today, and has had no further seizures, have to see how she does with physical therapy and mobility, but at this point her prognosis appears to be closer to weeks to months. She likely will be stable enough to have some quality time at home with her family. Per family, she was specific about not wanting to die at home but they are not sure about that and need to confirm with her so if her prognosis appeared to be closer to the days to weeks point, they would be looking into an alternative plan. I explained that for now it appears she may stabilize to be able to come home but if we start seeing her decompensate, that hospice at home would work with them on transitioning her to an alternative setting so she does not die at home. For now they want to see how she is able to mobilize with PT before speaking to hospice again. Verito my  will continue to follow in the next two days as I will be away on vacation. If symptoms or clinical status should change, please contact REINA Pradhan. - met with patient and then her daughter Lindy separately outside the room along with son Tristan and DU Sellers. Patient is much more alert today, and has had no further seizures, have to see how she does with physical therapy and mobility, but at this point her prognosis appears to be closer to weeks to months. She likely will be stable enough to have some quality time at home with her family. Per family, she was specific about not wanting to die at home but they are not sure about that and need to confirm with her so if her prognosis appeared to be closer to the days to weeks point, they would be looking into an alternative plan. I explained that for now it appears she may stabilize to be able to come home but if we start seeing her decompensate, that hospice at home would work with them on transitioning her to an alternative setting so she does not die at home. For now they want to see how she is able to mobilize with PT before speaking to hospice again. Would leave her on PO decadron on transition to home setting (4mg PO every 24 hours should be sufficient), and of course anti epileptics. Verito my  will continue to follow in the next two days as I will be away on vacation. If symptoms or clinical status should change, please contact REINA Pradhan.

## 2018-04-11 NOTE — PROGRESS NOTE ADULT - PROBLEM SELECTOR PLAN 2
- now back on keppra, no further seizures, can probably transition to PO now that she is awake and oriented and eating

## 2018-04-11 NOTE — PROGRESS NOTE ADULT - PROBLEM SELECTOR PLAN 3
-PT eval pending, out of bed to chair and see how she does. patient was functionally independent prior to this.

## 2018-04-11 NOTE — PROGRESS NOTE ADULT - PROBLEM SELECTOR PLAN 2
continue with providers' plan of treatment to keep patient comfortable. diet as tolerated. comfort care only

## 2018-04-11 NOTE — PROGRESS NOTE ADULT - SUBJECTIVE AND OBJECTIVE BOX
DARA JACKSON Patient is a 68y old  Female who presents with a chief complaint of New onset seizure (10 Apr 2018 12:04)     HPI:  This is a 67 yo woman with a pmh/o HTN, HLD, Breast CA first diagnosed in  and s/p surgery and chemo, who went into remission  and  rediagnosed  in 2017 was with recurrent metastatic cancer with mets to brain. She was recently discharge from hospital on Keppra and decadron taper, brought to ED with new onset seizure.  Per daughter Lindy - HCP, she stopped taking Keppra 2 weeks ago due to side effects, had last RT last Thursday, last chemo March. Today she was noted to have generalized tonic clonic seizure at home and one with EMS, received Versed. In Ed she received Keppra and decadron. At the time of exam patient was sleeping.     Per daughter no h/o fever, chills, abd. pain, nausea, vomiting, chest pain, SOB.     Had a lengthy discussion with Family - daughter Lindy (HCP), sister and other family members, pt dosen't want any any tube, DNR/DNI, if deteriorate will focus on comfort care. (10 Apr 2018 12:04)    The patient was seen and evaluated   The patient is in no acute distress.  Denied any fever chest pain, palpitations, shortness of breath, abdominal pain, fever, dysuria, cough, edema   Complains of     Height (cm): 152.4 (04-10 @ 13:25)  Weight (kg): 61.689 (04-10 @ 13:25)  BMI (kg/m2): 26.6 (04-10 @ 13:25)  BSA (m2): 1.58 (04-10 @ 13:25)I&O's Summary    10 Apr 2018 07:01  -  2018 07:00  --------------------------------------------------------  IN: 100 mL / OUT: 0 mL / NET: 100 mL      Allergies    No Known Allergies    Intolerances      HEALTH ISSUES - PROBLEM Dx:  HTN (hypertension): HTN (hypertension)  Hyperlipidemia, unspecified hyperlipidemia type: Hyperlipidemia, unspecified hyperlipidemia type  Palliative care encounter: Palliative care encounter  Debility: Debility        PAST MEDICAL & SURGICAL HISTORY:  Hyperlipidemia, unspecified hyperlipidemia type  Breast cancer: Mestatiszied to lung/brain  HTN (hypertension)  History of breast augmentation   delivery delivered  H/O mastectomy, bilateral          Vital Signs Last 24 Hrs  T(C): 36.4 (2018 07:52), Max: 36.9 (2018 04:46)  T(F): 97.5 (2018 07:52), Max: 98.4 (2018 04:46)  HR: 81 (2018 07:52) (77 - 88)  BP: 159/78 (2018 07:52) (150/70 - 166/88)  BP(mean): --  RR: 17 (2018 07:52) (17 - 20)  SpO2: 94% (2018 07:52) (94% - 97%)T(C): 36.4 (18 @ 07:52), Max: 36.9 (18 @ 04:46)  HR: 81 (18 @ 07:52) (77 - 88)  BP: 159/78 (18 @ 07:52) (150/70 - 166/88)  RR: 17 (18 @ 07:52) (17 - 20)  SpO2: 94% (18 @ 07:52) (94% - 97%)  Wt(kg): --    PHYSICAL EXAM:    GENERAL: NAD, well-groomed, well-developed  HEAD:  Atraumatic, Normocephalic  EYES: EOMI, PERRLA, conjunctiva and sclera clear  ENMT:  Moist mucous membranes,  No lesions  NECK: Supple, No JVD, Normal thyroid  NERVOUS SYSTEM:  Alert & Oriented X3,  Moves upper and lower extremities; DTRs 2+ intact and symmetric  CHEST/LUNG: Clear to auscultation bilaterally; No rales, rhonchi, wheezing,   HEART: Regular rate and rhythm; No murmurs,   ABDOMEN: Soft, Nontender, Nondistended; Bowel sounds present  EXTREMITIES:  Peripheral Pulses, No  cyanosis, or edema  SKIN: No rashes or lesions    dexamethasone  Injectable 4 milliGRAM(s) IV Push every 6 hours  dextrose 5% + sodium chloride 0.9% with potassium chloride 20 mEq/L 1000 milliLiter(s) IV Continuous <Continuous>  dextrose 5%. 1000 milliLiter(s) IV Continuous <Continuous>  dextrose 50% Injectable 12.5 Gram(s) IV Push once  dextrose 50% Injectable 25 Gram(s) IV Push once  dextrose 50% Injectable 25 Gram(s) IV Push once  dextrose Gel 1 Dose(s) Oral once PRN  glucagon  Injectable 1 milliGRAM(s) IntraMuscular once PRN  insulin lispro (HumaLOG) corrective regimen sliding scale   SubCutaneous three times a day before meals  labetalol Injectable 5 milliGRAM(s) IV Push every 6 hours PRN  levETIRAcetam  IVPB 500 milliGRAM(s) IV Intermittent every 12 hours  LORazepam   Injectable 2 milliGRAM(s) IV Push every 4 hours PRN  LORazepam   Injectable 0.5 milliGRAM(s) IV Push every 2 hours PRN  morphine  - Injectable 2 milliGRAM(s) IV Push every 4 hours PRN  pantoprazole  Injectable 40 milliGRAM(s) IV Push daily      LABS:                          10.3   7.4   )-----------( 224      ( 10 Apr 2018 07:48 )             34.0     04-10    136  |  101  |  28.0<H>  ----------------------------<  165<H>  3.5   |  21.0<L>  |  0.72    Ca    8.9      10 Apr 2018 07:48    TPro  5.4<L>  /  Alb  3.0<L>  /  TBili  <0.2<L>  /  DBili  x   /  AST  23  /  ALT  22  /  AlkPhos  88  04-10    LIVER FUNCTIONS - ( 10 Apr 2018 07:48 )  Alb: 3.0 g/dL / Pro: 5.4 g/dL / ALK PHOS: 88 U/L / ALT: 22 U/L / AST: 23 U/L / GGT: x                   CAPILLARY BLOOD GLUCOSE      POCT Blood Glucose.: 127 mg/dL (2018 07:54)  POCT Blood Glucose.: 133 mg/dL (2018 05:53)  POCT Blood Glucose.: 157 mg/dL (10 Apr 2018 18:14)      RADIOLOGY & ADDITIONAL TESTS:      Consultant notes reviewed    Case discussed with consultant/provider/ family /patient DARA JACKSON Patient is a 68y old  Female who presents with a chief complaint of New onset seizure (10 Apr 2018 12:04)     HPI:  This is a 69 yo woman with a pmh/o HTN, HLD, Breast CA first diagnosed in  and s/p surgery and chemo, who went into remission  and  rediagnosed  in 2017 was with recurrent metastatic cancer with mets to brain. She was recently discharge from hospital on Keppra and decadron taper, brought to ED with new onset seizure.  Per daughter Lindy - HCP, she stopped taking Keppra 2 weeks ago due to side effects, had last RT last Thursday, last chemo March. Today she was noted to have generalized tonic clonic seizure at home and one with EMS, received Versed. In Ed she received Keppra and decadron. At the time of exam patient was sleeping.     Per daughter no h/o fever, chills, abd. pain, nausea, vomiting, chest pain, SOB.           Height (cm): 152.4 (04-10 @ 13:25)  Weight (kg): 61.689 (04-10 @ 13:25)  BMI (kg/m2): 26.6 (04-10 @ 13:25)  BSA (m2): 1.58 (04-10 @ 13:25)I&O's Summary    10 Apr 2018 07:01  -  2018 07:00  --------------------------------------------------------  IN: 100 mL / OUT: 0 mL / NET: 100 mL      Allergies    No Known Allergies    Intolerances      HEALTH ISSUES - PROBLEM Dx:  HTN (hypertension): HTN (hypertension)  Hyperlipidemia, unspecified hyperlipidemia type: Hyperlipidemia, unspecified hyperlipidemia type  Palliative care encounter: Palliative care encounter  Debility: Debility        PAST MEDICAL & SURGICAL HISTORY:  Hyperlipidemia, unspecified hyperlipidemia type  Breast cancer: Mestatiszied to lung/brain  HTN (hypertension)  History of breast augmentation   delivery delivered  H/O mastectomy, bilateral          Vital Signs Last 24 Hrs  T(C): 36.4 (2018 07:52), Max: 36.9 (2018 04:46)  T(F): 97.5 (2018 07:52), Max: 98.4 (2018 04:46)  HR: 81 (2018 07:52) (77 - 88)  BP: 159/78 (2018 07:52) (150/70 - 166/88)  BP(mean): --  RR: 17 (2018 07:52) (17 - 20)  SpO2: 94% (2018 07:52) (94% - 97%)T(C): 36.4 (18 @ 07:52), Max: 36.9 (18 @ 04:46)  HR: 81 (18 @ 07:52) (77 - 88)  BP: 159/78 (18 @ 07:52) (150/70 - 166/88)  RR: 17 (18 @ 07:52) (17 - 20)  SpO2: 94% (18 @ 07:52) (94% - 97%)  Wt(kg): --    PHYSICAL EXAM:    GENERAL: NAD, well-groomed, well-developed  HEAD:  Atraumatic, Normocephalic  EYES: EOMI, PERRLA, conjunctiva and sclera clear  ENMT:  Moist mucous membranes,  No lesions  NECK: Supple, No JVD, Normal thyroid  NERVOUS SYSTEM:  Alert & Oriented X3,  Moves upper and lower extremities; DTRs 2+ intact and symmetric  CHEST/LUNG: Clear to auscultation bilaterally; No rales, rhonchi, wheezing,   HEART: Regular rate and rhythm; No murmurs,   ABDOMEN: Soft, Nontender, Nondistended; Bowel sounds present  EXTREMITIES:  Peripheral Pulses, No  cyanosis, or edema  SKIN: No rashes or lesions    dexamethasone  Injectable 4 milliGRAM(s) IV Push every 6 hours  dextrose 5% + sodium chloride 0.9% with potassium chloride 20 mEq/L 1000 milliLiter(s) IV Continuous <Continuous>  dextrose 5%. 1000 milliLiter(s) IV Continuous <Continuous>  dextrose 50% Injectable 12.5 Gram(s) IV Push once  dextrose 50% Injectable 25 Gram(s) IV Push once  dextrose 50% Injectable 25 Gram(s) IV Push once  dextrose Gel 1 Dose(s) Oral once PRN  glucagon  Injectable 1 milliGRAM(s) IntraMuscular once PRN  insulin lispro (HumaLOG) corrective regimen sliding scale   SubCutaneous three times a day before meals  labetalol Injectable 5 milliGRAM(s) IV Push every 6 hours PRN  levETIRAcetam  IVPB 500 milliGRAM(s) IV Intermittent every 12 hours  LORazepam   Injectable 2 milliGRAM(s) IV Push every 4 hours PRN  LORazepam   Injectable 0.5 milliGRAM(s) IV Push every 2 hours PRN  morphine  - Injectable 2 milliGRAM(s) IV Push every 4 hours PRN  pantoprazole  Injectable 40 milliGRAM(s) IV Push daily      LABS:                          10.3   7.4   )-----------( 224      ( 10 Apr 2018 07:48 )             34.0     04-10    136  |  101  |  28.0<H>  ----------------------------<  165<H>  3.5   |  21.0<L>  |  0.72    Ca    8.9      10 Apr 2018 07:48    TPro  5.4<L>  /  Alb  3.0<L>  /  TBili  <0.2<L>  /  DBili  x   /  AST  23  /  ALT  22  /  AlkPhos  88  04-10    LIVER FUNCTIONS - ( 10 Apr 2018 07:48 )  Alb: 3.0 g/dL / Pro: 5.4 g/dL / ALK PHOS: 88 U/L / ALT: 22 U/L / AST: 23 U/L / GGT: x                   CAPILLARY BLOOD GLUCOSE      POCT Blood Glucose.: 127 mg/dL (2018 07:54)  POCT Blood Glucose.: 133 mg/dL (2018 05:53)  POCT Blood Glucose.: 157 mg/dL (10 Apr 2018 18:14)      RADIOLOGY & ADDITIONAL TESTS:      Consultant notes reviewed    Case discussed with consultant/provider/ family /patient

## 2018-04-11 NOTE — PROGRESS NOTE ADULT - PROBLEM SELECTOR PLAN 1
Palliative care  met with son this am @ beside to provide additional support and to discuss Hospice liaison options for inpatient care. appreciate Neurology input, c/w Keppra, decadron   PPI

## 2018-04-12 ENCOUNTER — OUTPATIENT (OUTPATIENT)
Dept: OUTPATIENT SERVICES | Facility: HOSPITAL | Age: 68
LOS: 1 days | Discharge: HOME | End: 2018-04-12
Payer: MEDICARE

## 2018-04-12 ENCOUNTER — TRANSCRIPTION ENCOUNTER (OUTPATIENT)
Age: 68
End: 2018-04-12

## 2018-04-12 VITALS
DIASTOLIC BLOOD PRESSURE: 80 MMHG | SYSTOLIC BLOOD PRESSURE: 148 MMHG | TEMPERATURE: 99 F | OXYGEN SATURATION: 99 % | HEART RATE: 80 BPM | RESPIRATION RATE: 18 BRPM

## 2018-04-12 DIAGNOSIS — Z98.890 OTHER SPECIFIED POSTPROCEDURAL STATES: Chronic | ICD-10-CM

## 2018-04-12 DIAGNOSIS — Z98.82 BREAST IMPLANT STATUS: Chronic | ICD-10-CM

## 2018-04-12 DIAGNOSIS — C50.919 MALIGNANT NEOPLASM OF UNSPECIFIED SITE OF UNSPECIFIED FEMALE BREAST: ICD-10-CM

## 2018-04-12 LAB
GLUCOSE BLDC GLUCOMTR-MCNC: 133 MG/DL — HIGH (ref 70–99)
GLUCOSE BLDC GLUCOMTR-MCNC: 171 MG/DL — HIGH (ref 70–99)
PREALB SERPL-MCNC: 25 MG/DL — SIGNIFICANT CHANGE UP (ref 18–38)

## 2018-04-12 PROCEDURE — 70450 CT HEAD/BRAIN W/O DYE: CPT

## 2018-04-12 PROCEDURE — 80053 COMPREHEN METABOLIC PANEL: CPT

## 2018-04-12 PROCEDURE — 36415 COLL VENOUS BLD VENIPUNCTURE: CPT

## 2018-04-12 PROCEDURE — 84134 ASSAY OF PREALBUMIN: CPT

## 2018-04-12 PROCEDURE — 83605 ASSAY OF LACTIC ACID: CPT

## 2018-04-12 PROCEDURE — 93005 ELECTROCARDIOGRAM TRACING: CPT

## 2018-04-12 PROCEDURE — 82962 GLUCOSE BLOOD TEST: CPT

## 2018-04-12 PROCEDURE — 99285 EMERGENCY DEPT VISIT HI MDM: CPT | Mod: 25

## 2018-04-12 PROCEDURE — 71045 X-RAY EXAM CHEST 1 VIEW: CPT

## 2018-04-12 PROCEDURE — 85027 COMPLETE CBC AUTOMATED: CPT

## 2018-04-12 PROCEDURE — 96375 TX/PRO/DX INJ NEW DRUG ADDON: CPT

## 2018-04-12 PROCEDURE — 97163 PT EVAL HIGH COMPLEX 45 MIN: CPT

## 2018-04-12 PROCEDURE — 99239 HOSP IP/OBS DSCHRG MGMT >30: CPT

## 2018-04-12 PROCEDURE — 96374 THER/PROPH/DIAG INJ IV PUSH: CPT

## 2018-04-12 RX ORDER — DEXAMETHASONE 0.5 MG/5ML
1 ELIXIR ORAL
Qty: 60 | Refills: 0 | OUTPATIENT
Start: 2018-04-12 | End: 2018-05-01

## 2018-04-12 RX ORDER — DEXAMETHASONE 0.5 MG/5ML
1 ELIXIR ORAL
Qty: 2 | Refills: 0 | OUTPATIENT
Start: 2018-04-12 | End: 2018-04-12

## 2018-04-12 RX ORDER — LEVETIRACETAM 250 MG/1
1 TABLET, FILM COATED ORAL
Qty: 60 | Refills: 0 | OUTPATIENT
Start: 2018-04-12 | End: 2018-05-11

## 2018-04-12 RX ORDER — PANTOPRAZOLE SODIUM 20 MG/1
1 TABLET, DELAYED RELEASE ORAL
Qty: 20 | Refills: 0 | OUTPATIENT
Start: 2018-04-12 | End: 2018-05-01

## 2018-04-12 RX ADMIN — Medication 4 MILLIGRAM(S): at 08:47

## 2018-04-12 RX ADMIN — Medication 3 UNIT(S): at 16:11

## 2018-04-12 RX ADMIN — PANTOPRAZOLE SODIUM 40 MILLIGRAM(S): 20 TABLET, DELAYED RELEASE ORAL at 12:17

## 2018-04-12 RX ADMIN — Medication 4 MILLIGRAM(S): at 02:51

## 2018-04-12 RX ADMIN — DEXTROSE MONOHYDRATE, SODIUM CHLORIDE, AND POTASSIUM CHLORIDE 100 MILLILITER(S): 50; .745; 4.5 INJECTION, SOLUTION INTRAVENOUS at 12:17

## 2018-04-12 RX ADMIN — Medication 4 MILLIGRAM(S): at 13:45

## 2018-04-12 RX ADMIN — Medication 2: at 09:19

## 2018-04-12 RX ADMIN — LEVETIRACETAM 420 MILLIGRAM(S): 250 TABLET, FILM COATED ORAL at 06:25

## 2018-04-12 RX ADMIN — DEXTROSE MONOHYDRATE, SODIUM CHLORIDE, AND POTASSIUM CHLORIDE 100 MILLILITER(S): 50; .745; 4.5 INJECTION, SOLUTION INTRAVENOUS at 09:19

## 2018-04-12 NOTE — DISCHARGE NOTE ADULT - OTHER SIGNIFICANT FINDINGS
Pt is awake, having breakfast, feeling better, family present bedside, counseled to be compliant with Keppra, taper decadron, suggest to f/u with PCP and Neurology

## 2018-04-12 NOTE — PHYSICAL THERAPY INITIAL EVALUATION ADULT - CRITERIA FOR SKILLED THERAPEUTIC INTERVENTIONS
impairments found/therapy frequency/anticipated discharge recommendation/risk reduction/prevention/functional limitations in following categories/rehab potential/anticipated equipment needs at discharge

## 2018-04-12 NOTE — DISCHARGE NOTE ADULT - PATIENT PORTAL LINK FT
You can access the [facility] Patient Portal, offered by [facility], by registering with the following website: [email or web address]

## 2018-04-12 NOTE — DISCHARGE NOTE ADULT - HOSPITAL COURSE
This is a 67 yo woman with a pmh/o HTN, HLD, Breast CA first diagnosed in 2014 and s/p surgery and chemo, who went into remission 2015 and rediagnosed  in 2017 was with recurrent metastatic cancer with mets to brain. She was recently discharged from the hospital on Keppra and decadron taper, brought to ED with new onset seizure.  Per daughter Lindy - ROSA, she stopped taking Keppra 2 weeks ago due to side effects, had last RT last Thursday, last chemo March.  In Ed she received Keppra and decadron. Pt consulted by neurology, recommends pt to continue with keppra. Pt with no seizure activity since admission. Pt and family seen by palliative, family at this time not in agreement to hospice meeting. Plan for pt to return home with home assistance/home care. Pt will be discharged home with keppra and decadron taper. Pt to follow up with o/p neurology and or radiation oncologist. Pt stable for discharge home. pt and family agree to plan. This is a 67 yo woman with a pmh/o HTN, HLD, Breast CA first diagnosed in 2014 and s/p surgery and chemo, who went into remission 2015 and rediagnosed  in 2017 was with recurrent metastatic cancer with mets to brain. She was recently discharged from the hospital on Keppra and decadron taper, brought to ED with new onset seizure.  Per daughter Lindy LEE, she stopped taking Keppra 2 weeks ago due to side effects, had last RT last Thursday, last chemo March.  In Ed she received Keppra and decadron. Pt consulted by neurology, recommends pt to continue with keppra. Pt with no seizure activity since admission. Pt and family seen by palliative, family at this time not in agreement to hospice meeting. Plan for pt to return home with home assistance/home care. Pt will be discharged home with keppra and decadron taper. Pt to follow up with o/p neurology and or radiation oncologist. Pt stable for discharge home. pt and family agree to plan.     Pt was seen and examined at bedside. Family present. Pt in good spirits, no medical complaints. Pt denies any seizure like activity. Eating. Pt ready to go home. ROS negative. Family and pt agree with discharge plan (home with home PT/assistance).     GENERAL: NAD, well-groomed, well-developed  HEAD:  Atraumatic, Normocephalic  EYES: EOMI, PERRLA, conjunctiva and sclera clear  ENMT:  Moist mucous membranes,  No lesions  NECK: Supple, No JVD, Normal thyroid  NERVOUS SYSTEM:  Alert & Oriented X3,  Moves upper and lower extremities; DTRs 2+ intact and symmetric  CHEST/LUNG: Clear to auscultation bilaterally; No rales, rhonchi, wheezing,   HEART: Regular rate and rhythm; No murmurs,   ABDOMEN: Soft, Nontender, Nondistended; Bowel sounds present  EXTREMITIES:  Peripheral Pulses, No  cyanosis, or edema  SKIN: No rashes or lesions This is a 67 yo woman with a pmh/o HTN, HLD, Breast CA first diagnosed in 2014 and s/p surgery and chemo, who went into remission 2015 and rediagnosed  in 2017 was with recurrent metastatic cancer with mets to brain. She was recently discharged from the hospital on Keppra and decadron taper, brought to ED with new onset seizure.  Per daughter Lindy LEE, she stopped taking Keppra 2 weeks ago due to side effects, had last RT last Thursday, last chemo March.  In Ed she received Keppra and decadron. Pt consulted by neurology, recommends pt to continue with keppra. Pt with no seizure activity since admission. Pt and family seen by palliative, family at this time not in agreement to hospice meeting. Plan for pt to return home with home assistance/home care. Pt will be discharged home with keppra and decadron taper. Pt to follow up with o/p neurology and or radiation oncologist. Pt stable for discharge home. pt and family agree to plan.     Pt was seen and examined at bedside. Family present. Pt in good spirits, no medical complaints. Pt denies any seizure like activity. Eating. Pt ready to go home. ROS negative. Family and pt agree with discharge plan (home with home PT/assistance).     GENERAL: NAD, well-groomed, well-developed  HEAD:  Atraumatic, Normocephalic  EYES: EOMI, PERRLA, conjunctiva and sclera clear  ENMT:  Moist mucous membranes,  No lesions  NECK: Supple, No JVD, Normal thyroid  NERVOUS SYSTEM:  Alert & Oriented X3,  Moves upper and lower extremities; DTRs 2+ intact and symmetric  CHEST/LUNG: Clear to auscultation bilaterally; No rales, rhonchi, wheezing,   HEART: Regular rate and rhythm; No murmurs,   ABDOMEN: Soft, Nontender, Nondistended; Bowel sounds present  EXTREMITIES:  Peripheral Pulses, No  cyanosis, or edema  SKIN: No rashes or lesions    Time spent 40 minutes

## 2018-04-12 NOTE — DISCHARGE NOTE ADULT - MEDICATION SUMMARY - MEDICATIONS TO TAKE
I will START or STAY ON the medications listed below when I get home from the hospital:    rolling walker  -- rolling walker for asssitance with ambulation   -- Indication: For gait assistance    dexamethasone 4 mg oral tablet  -- 1 tab every 8 hrs x 2 days  1 tab every 12 hrs x 2 days  1 tab once a day until follow up  -- It is very important that you take or use this exactly as directed.  Do not skip doses or discontinue unless directed by your doctor.  Obtain medical advice before taking any non-prescription drugs as some may affect the action of this medication.  Take with food or milk.    -- Indication: For edema    aspirin 81 mg oral tablet  -- 1 tab(s) by mouth once a day  -- Indication: For general health     Keppra 500 mg oral tablet  -- 1 tab(s) by mouth 2 times a day   -- Check with your doctor before becoming pregnant.  It is very important that you take or use this exactly as directed.  Do not skip doses or discontinue unless directed by your doctor.  May cause drowsiness or dizziness.  Obtain medical advice before taking any non-prescription drugs as some may affect the action of this medication.  Swallow whole.  Do not crush.  This drug may impair the ability to drive or operate machinery.  Use care until you become familiar with its effects.    -- Indication: For seizure    simvastatin 20 mg oral tablet  -- 1 tab(s) by mouth once a day (at bedtime)  -- Indication: For HLD    metoprolol succinate 50 mg oral tablet, extended release  -- 1 tab(s) by mouth once a day  -- Indication: For HTN (hypertension)    amLODIPine 5 mg oral tablet  -- 1 tab(s) by mouth once a day  -- Indication: For HTN (hypertension)    cholecalciferol 1000 intl units oral tablet  -- 1 tab(s) by mouth once a day  -- Indication: For general health I will START or STAY ON the medications listed below when I get home from the hospital:    rolling walker  -- rolling walker for asssitance with ambulation   -- Indication: For gait assistance    dexamethasone 4 mg oral tablet  -- 1 tab every 8 hrs x 2 days (4/13, 4/14)  1 tab every 12 hrs x 2 days (4/15, 4/16)  1 tab once a day until follow up (4/17 and on)  -- It is very important that you take or use this exactly as directed.  Do not skip doses or discontinue unless directed by your doctor.  Obtain medical advice before taking any non-prescription drugs as some may affect the action of this medication.  Take with food or milk.    -- Indication: For edema    dexamethasone 4 mg oral tablet  -- 1 tab at 2pm 4/12/18  1 tab at 8pm 4/12/18    Will then resume additona decadron as ordered starting 4/13  -- It is very important that you take or use this exactly as directed.  Do not skip doses or discontinue unless directed by your doctor.  Obtain medical advice before taking any non-prescription drugs as some may affect the action of this medication.  Take with food or milk.    -- Indication: For edema    aspirin 81 mg oral tablet  -- 1 tab(s) by mouth once a day  -- Indication: For general health     Keppra 500 mg oral tablet  -- 1 tab(s) by mouth 2 times a day   -- Check with your doctor before becoming pregnant.  It is very important that you take or use this exactly as directed.  Do not skip doses or discontinue unless directed by your doctor.  May cause drowsiness or dizziness.  Obtain medical advice before taking any non-prescription drugs as some may affect the action of this medication.  Swallow whole.  Do not crush.  This drug may impair the ability to drive or operate machinery.  Use care until you become familiar with its effects.    -- Indication: For seizure    simvastatin 20 mg oral tablet  -- 1 tab(s) by mouth once a day (at bedtime)  -- Indication: For HLD    metoprolol succinate 50 mg oral tablet, extended release  -- 1 tab(s) by mouth once a day  -- Indication: For HTN (hypertension)    amLODIPine 5 mg oral tablet  -- 1 tab(s) by mouth once a day  -- Indication: For HTN (hypertension)    cholecalciferol 1000 intl units oral tablet  -- 1 tab(s) by mouth once a day  -- Indication: For general health I will START or STAY ON the medications listed below when I get home from the hospital:    rolling walker  -- rolling walker for asssitance with ambulation   -- Indication: For gait assistance    dexamethasone 4 mg oral tablet  -- 1 tab every 8 hrs x 2 days (4/13, 4/14)  1 tab every 12 hrs x 2 days (4/15, 4/16)  1 tab once a day until follow up (4/17 and on)  -- It is very important that you take or use this exactly as directed.  Do not skip doses or discontinue unless directed by your doctor.  Obtain medical advice before taking any non-prescription drugs as some may affect the action of this medication.  Take with food or milk.    -- Indication: For edema    dexamethasone 4 mg oral tablet  -- 1 tab at 2pm 4/12/18  1 tab at 8pm 4/12/18    Will then resume additona decadron as ordered starting 4/13  -- It is very important that you take or use this exactly as directed.  Do not skip doses or discontinue unless directed by your doctor.  Obtain medical advice before taking any non-prescription drugs as some may affect the action of this medication.  Take with food or milk.    -- Indication: For edema    aspirin 81 mg oral tablet  -- 1 tab(s) by mouth once a day  -- Indication: For general health     Keppra 500 mg oral tablet  -- 1 tab(s) by mouth 2 times a day   -- Check with your doctor before becoming pregnant.  It is very important that you take or use this exactly as directed.  Do not skip doses or discontinue unless directed by your doctor.  May cause drowsiness or dizziness.  Obtain medical advice before taking any non-prescription drugs as some may affect the action of this medication.  Swallow whole.  Do not crush.  This drug may impair the ability to drive or operate machinery.  Use care until you become familiar with its effects.    -- Indication: For seizure    simvastatin 20 mg oral tablet  -- 1 tab(s) by mouth once a day (at bedtime)  -- Indication: For HLD    metoprolol succinate 50 mg oral tablet, extended release  -- 1 tab(s) by mouth once a day  -- Indication: For HTN (hypertension)    amLODIPine 5 mg oral tablet  -- 1 tab(s) by mouth once a day  -- Indication: For HTN (hypertension)    Protonix 40 mg oral delayed release tablet  -- 1 tab(s) by mouth once a day   -- It is very important that you take or use this exactly as directed.  Do not skip doses or discontinue unless directed by your doctor.  Obtain medical advice before taking any non-prescription drugs as some may affect the action of this medication.  Swallow whole.  Do not crush.    -- Indication: For Prophylactic measure    cholecalciferol 1000 intl units oral tablet  -- 1 tab(s) by mouth once a day  -- Indication: For general health

## 2018-04-12 NOTE — DISCHARGE NOTE ADULT - CARE PROVIDER_API CALL
Buster Beltrán), Neurology; Vascular Neurology  [address] [place] [address]  Phone: [phone]  Fax: [phone] Buster Beltrán), Neurology; Vascular Neurology  [address] [place] [address]  Phone: [phone]  Fax: [phone]

## 2018-04-12 NOTE — PHYSICAL THERAPY INITIAL EVALUATION ADULT - PERTINENT HX OF CURRENT PROBLEM, REHAB EVAL
This is a 67 yo woman with a pmh/o HTN, HLD, Breast CA first diagnosed in 2014 and s/p surgery and chemo, who went into remission 2015 and  rediagnosed  in 2017 was with recurrent metastatic cancer with mets to brain. She was recently discharge from hospital on Keppra and decadron taper, brought to ED with new onset seizure.Today she was noted to have generalized tonic clonic seizure at home and one with EMS, received Versed.

## 2018-04-12 NOTE — DISCHARGE NOTE ADULT - CARE PLAN
Principal Discharge DX:	New onset seizure  Goal:	resolved  Assessment and plan of treatment:	Pt to follow up o/p with neurologist and or radiation oncologist  Pt to cotinue taking keppra. Educated on the importance of compliance with this medication. Pt and family verbalizes understanding  Decadron to be continued until follow up with neurologist or radiation oncologist. Script send to pts preferred pharmacy along with keppra  Secondary Diagnosis:	Debility  Assessment and plan of treatment:	home with home care/assistance and rolling walker  Secondary Diagnosis:	Metastatic breast cancer  Assessment and plan of treatment:	Comfort care  Secondary Diagnosis:	Hyperlipidemia, unspecified hyperlipidemia type  Assessment and plan of treatment:	c/w home meds  further management as per PMD  Secondary Diagnosis:	HTN (hypertension)  Assessment and plan of treatment:	c/w home meds  further management as per PMD Principal Discharge DX:	New onset seizure  Goal:	resolved  Assessment and plan of treatment:	Pt to follow up o/p with neurologist and or radiation oncologist  Pt to cotinue taking keppra. Educated on the importance of compliance with this medication. Pt and family verbalizes understanding  Decadron to be continued until follow up with neurologist or radiation oncologist. Script send to pts preferred pharmacy along with keppra  Secondary Diagnosis:	Debility  Assessment and plan of treatment:	home with home care/assistance and rolling walker  Secondary Diagnosis:	Metastatic breast cancer  Assessment and plan of treatment:	Comfort care  Secondary Diagnosis:	Hyperlipidemia, unspecified hyperlipidemia type  Assessment and plan of treatment:	c/w home meds  further management as per PMD  Secondary Diagnosis:	HTN (hypertension)  Assessment and plan of treatment:	c/w home meds  further management as per PMD  Secondary Diagnosis:	Palliative care encounter

## 2018-04-12 NOTE — DISCHARGE NOTE ADULT - SECONDARY DIAGNOSIS.
Debility Metastatic breast cancer Hyperlipidemia, unspecified hyperlipidemia type HTN (hypertension) Palliative care encounter

## 2018-04-12 NOTE — DISCHARGE NOTE ADULT - PLAN OF CARE
resolved Pt to follow up o/p with neurologist and or radiation oncologist  Pt to cotinue taking keppra. Educated on the importance of compliance with this medication. Pt and family verbalizes understanding  Decadron to be continued until follow up with neurologist or radiation oncologist. Script send to pts preferred pharmacy along with keppra home with home care/assistance and rolling walker Comfort care c/w home meds  further management as per PMD

## 2018-04-12 NOTE — PHYSICAL THERAPY INITIAL EVALUATION ADULT - ADDITIONAL COMMENTS
pt states(son garrison confirms) pt lives with her , son garrison, and grandchildren in a 1-story house with 3 steps to enter (+right rail), then none inside except to basement. pt and family state someone is always home with pt. son states everyone works different hours, so someone is always awake and can assist. has shower chair at home.

## 2018-04-13 RX ORDER — DEXAMETHASONE 0.5 MG/5ML
1 ELIXIR ORAL
Qty: 60 | Refills: 0 | OUTPATIENT
Start: 2018-04-13 | End: 2018-05-02

## 2018-04-17 ENCOUNTER — APPOINTMENT (OUTPATIENT)
Dept: HEMATOLOGY ONCOLOGY | Facility: CLINIC | Age: 68
End: 2018-04-17
Payer: MEDICARE

## 2018-04-17 VITALS
DIASTOLIC BLOOD PRESSURE: 88 MMHG | SYSTOLIC BLOOD PRESSURE: 157 MMHG | HEIGHT: 60 IN | HEART RATE: 67 BPM | WEIGHT: 128.31 LBS | BODY MASS INDEX: 25.19 KG/M2 | OXYGEN SATURATION: 97 % | TEMPERATURE: 98.4 F

## 2018-04-17 PROCEDURE — 99205 OFFICE O/P NEW HI 60 MIN: CPT

## 2018-04-17 RX ORDER — METOPROLOL TARTRATE 50 MG/1
50 TABLET, FILM COATED ORAL DAILY
Refills: 0 | Status: ACTIVE | COMMUNITY

## 2018-04-17 RX ORDER — SIMVASTATIN 20 MG/1
20 TABLET, FILM COATED ORAL DAILY
Refills: 0 | Status: ACTIVE | COMMUNITY

## 2018-04-17 RX ORDER — AMLODIPINE BESYLATE 5 MG/1
5 TABLET ORAL DAILY
Qty: 30 | Refills: 0 | Status: ACTIVE | COMMUNITY

## 2018-04-17 RX ORDER — ASPIRIN 81 MG
81 TABLET, DELAYED RELEASE (ENTERIC COATED) ORAL DAILY
Refills: 0 | Status: ACTIVE | COMMUNITY

## 2018-04-17 RX ORDER — MULTIVIT-MIN/IRON/FOLIC ACID/K 18-600-40
CAPSULE ORAL
Refills: 0 | Status: ACTIVE | COMMUNITY

## 2018-04-19 ENCOUNTER — APPOINTMENT (OUTPATIENT)
Dept: NEUROLOGY | Facility: CLINIC | Age: 68
End: 2018-04-19
Payer: MEDICARE

## 2018-04-19 ENCOUNTER — MEDICATION RENEWAL (OUTPATIENT)
Age: 68
End: 2018-04-19

## 2018-04-19 VITALS
HEIGHT: 60 IN | BODY MASS INDEX: 26.7 KG/M2 | SYSTOLIC BLOOD PRESSURE: 130 MMHG | DIASTOLIC BLOOD PRESSURE: 80 MMHG | WEIGHT: 136 LBS

## 2018-04-19 VITALS — WEIGHT: 136 LBS | BODY MASS INDEX: 26.7 KG/M2 | HEIGHT: 60 IN

## 2018-04-19 DIAGNOSIS — G40.909 EPILEPSY, UNSPECIFIED, NOT INTRACTABLE, W/OUT STATUS EPILEPTICUS: ICD-10-CM

## 2018-04-19 DIAGNOSIS — M54.16 RADICULOPATHY, LUMBAR REGION: ICD-10-CM

## 2018-04-19 PROCEDURE — 99214 OFFICE O/P EST MOD 30 MIN: CPT

## 2018-04-19 RX ORDER — NABUMETONE 500 MG/1
500 TABLET, FILM COATED ORAL
Qty: 50 | Refills: 2 | Status: ACTIVE | COMMUNITY
Start: 2018-04-19 | End: 1900-01-01

## 2018-04-19 RX ORDER — LEVETIRACETAM 500 MG/1
500 TABLET, FILM COATED ORAL TWICE DAILY
Qty: 180 | Refills: 1 | Status: ACTIVE | COMMUNITY
Start: 1900-01-01 | End: 1900-01-01

## 2018-04-24 ENCOUNTER — RESULT REVIEW (OUTPATIENT)
Age: 68
End: 2018-04-24

## 2018-04-24 PROCEDURE — 88321 CONSLTJ&REPRT SLD PREP ELSWR: CPT

## 2018-04-27 ENCOUNTER — APPOINTMENT (OUTPATIENT)
Dept: RADIATION ONCOLOGY | Facility: CLINIC | Age: 68
End: 2018-04-27
Payer: MEDICARE

## 2018-04-27 DIAGNOSIS — C50.919 MALIGNANT NEOPLASM OF UNSPECIFIED SITE OF UNSPECIFIED FEMALE BREAST: ICD-10-CM

## 2018-04-27 DIAGNOSIS — C79.31 MALIGNANT NEOPLASM OF UNSPECIFIED SITE OF UNSPECIFIED FEMALE BREAST: ICD-10-CM

## 2018-04-27 PROCEDURE — 99024 POSTOP FOLLOW-UP VISIT: CPT

## 2018-04-29 ENCOUNTER — FORM ENCOUNTER (OUTPATIENT)
Age: 68
End: 2018-04-29

## 2018-04-30 ENCOUNTER — APPOINTMENT (OUTPATIENT)
Dept: NUCLEAR MEDICINE | Facility: CLINIC | Age: 68
End: 2018-04-30
Payer: MEDICARE

## 2018-04-30 ENCOUNTER — RESULT REVIEW (OUTPATIENT)
Age: 68
End: 2018-04-30

## 2018-04-30 ENCOUNTER — OUTPATIENT (OUTPATIENT)
Dept: OUTPATIENT SERVICES | Facility: HOSPITAL | Age: 68
LOS: 1 days | End: 2018-04-30

## 2018-04-30 ENCOUNTER — APPOINTMENT (OUTPATIENT)
Dept: HEMATOLOGY ONCOLOGY | Facility: CLINIC | Age: 68
End: 2018-04-30
Payer: MEDICARE

## 2018-04-30 VITALS
HEIGHT: 60 IN | OXYGEN SATURATION: 96 % | HEART RATE: 83 BPM | DIASTOLIC BLOOD PRESSURE: 55 MMHG | SYSTOLIC BLOOD PRESSURE: 111 MMHG

## 2018-04-30 DIAGNOSIS — M54.6 PAIN IN THORACIC SPINE: ICD-10-CM

## 2018-04-30 DIAGNOSIS — Z98.82 BREAST IMPLANT STATUS: Chronic | ICD-10-CM

## 2018-04-30 DIAGNOSIS — Z98.890 OTHER SPECIFIED POSTPROCEDURAL STATES: Chronic | ICD-10-CM

## 2018-04-30 DIAGNOSIS — C50.919 MALIGNANT NEOPLASM OF UNSPECIFIED SITE OF UNSPECIFIED FEMALE BREAST: ICD-10-CM

## 2018-04-30 LAB
BASOPHILS # BLD AUTO: 0.1 K/UL — SIGNIFICANT CHANGE UP (ref 0–0.2)
BASOPHILS NFR BLD AUTO: 0.5 % — SIGNIFICANT CHANGE UP (ref 0–2)
EOSINOPHIL # BLD AUTO: 0.1 K/UL — SIGNIFICANT CHANGE UP (ref 0–0.5)
EOSINOPHIL NFR BLD AUTO: 1.2 % — SIGNIFICANT CHANGE UP (ref 0–6)
HCT VFR BLD CALC: 40 % — SIGNIFICANT CHANGE UP (ref 34.5–45)
HGB BLD-MCNC: 13 G/DL — SIGNIFICANT CHANGE UP (ref 11.5–15.5)
LYMPHOCYTES # BLD AUTO: 17.4 % — SIGNIFICANT CHANGE UP (ref 13–44)
LYMPHOCYTES # BLD AUTO: 2 K/UL — SIGNIFICANT CHANGE UP (ref 1–3.3)
MCHC RBC-ENTMCNC: 25.4 PG — LOW (ref 27–34)
MCHC RBC-ENTMCNC: 32.6 GM/DL — SIGNIFICANT CHANGE UP (ref 32–36)
MCV RBC AUTO: 78.1 FL — LOW (ref 80–100)
MONOCYTES # BLD AUTO: 0.4 K/UL — SIGNIFICANT CHANGE UP (ref 0–0.9)
MONOCYTES NFR BLD AUTO: 3.6 % — SIGNIFICANT CHANGE UP (ref 2–14)
NEUTROPHILS # BLD AUTO: 8.8 K/UL — HIGH (ref 1.8–7.4)
NEUTROPHILS NFR BLD AUTO: 77.4 % — HIGH (ref 43–77)
PLATELET # BLD AUTO: 193 K/UL — SIGNIFICANT CHANGE UP (ref 150–400)
RBC # BLD: 5.13 M/UL — SIGNIFICANT CHANGE UP (ref 3.8–5.2)
RBC # FLD: 17.6 % — HIGH (ref 10.3–14.5)
WBC # BLD: 11.3 K/UL — HIGH (ref 3.8–10.5)
WBC # FLD AUTO: 11.3 K/UL — HIGH (ref 3.8–10.5)

## 2018-04-30 PROCEDURE — 78815 PET IMAGE W/CT SKULL-THIGH: CPT | Mod: 26,PS

## 2018-04-30 PROCEDURE — XXXXX: CPT

## 2018-05-01 ENCOUNTER — APPOINTMENT (OUTPATIENT)
Dept: INFUSION THERAPY | Facility: CLINIC | Age: 68
End: 2018-05-01

## 2018-05-01 ENCOUNTER — LABORATORY RESULT (OUTPATIENT)
Age: 68
End: 2018-05-01

## 2018-05-01 ENCOUNTER — RESULT REVIEW (OUTPATIENT)
Age: 68
End: 2018-05-01

## 2018-05-01 ENCOUNTER — APPOINTMENT (OUTPATIENT)
Dept: HEMATOLOGY ONCOLOGY | Facility: CLINIC | Age: 68
End: 2018-05-01
Payer: MEDICARE

## 2018-05-01 VITALS
SYSTOLIC BLOOD PRESSURE: 130 MMHG | TEMPERATURE: 97.6 F | HEIGHT: 60 IN | HEART RATE: 91 BPM | OXYGEN SATURATION: 91 % | DIASTOLIC BLOOD PRESSURE: 81 MMHG

## 2018-05-01 DIAGNOSIS — C50.919 MALIGNANT NEOPLASM OF UNSPECIFIED SITE OF UNSPECIFIED FEMALE BREAST: ICD-10-CM

## 2018-05-01 LAB
BASOPHILS # BLD AUTO: 0 K/UL — SIGNIFICANT CHANGE UP (ref 0–0.2)
BASOPHILS NFR BLD AUTO: 0.7 % — SIGNIFICANT CHANGE UP (ref 0–2)
EOSINOPHIL # BLD AUTO: 0.1 K/UL — SIGNIFICANT CHANGE UP (ref 0–0.5)
EOSINOPHIL NFR BLD AUTO: 0.8 % — SIGNIFICANT CHANGE UP (ref 0–6)
HCT VFR BLD CALC: 35.2 % — SIGNIFICANT CHANGE UP (ref 34.5–45)
HGB BLD-MCNC: 11.3 G/DL — LOW (ref 11.5–15.5)
LYMPHOCYTES # BLD AUTO: 0.8 K/UL — LOW (ref 1–3.3)
LYMPHOCYTES # BLD AUTO: 10.3 % — LOW (ref 13–44)
MCHC RBC-ENTMCNC: 25.6 PG — LOW (ref 27–34)
MCHC RBC-ENTMCNC: 32 GM/DL — SIGNIFICANT CHANGE UP (ref 32–36)
MCV RBC AUTO: 79.8 FL — LOW (ref 80–100)
MONOCYTES # BLD AUTO: 0.4 K/UL — SIGNIFICANT CHANGE UP (ref 0–0.9)
MONOCYTES NFR BLD AUTO: 4.8 % — SIGNIFICANT CHANGE UP (ref 2–14)
NEUTROPHILS # BLD AUTO: 6.2 K/UL — SIGNIFICANT CHANGE UP (ref 1.8–7.4)
NEUTROPHILS NFR BLD AUTO: 83.4 % — HIGH (ref 43–77)
PLATELET # BLD AUTO: 124 K/UL — LOW (ref 150–400)
RBC # BLD: 4.4 M/UL — SIGNIFICANT CHANGE UP (ref 3.8–5.2)
RBC # FLD: 17.8 % — HIGH (ref 10.3–14.5)
WBC # BLD: 7.4 K/UL — SIGNIFICANT CHANGE UP (ref 3.8–10.5)
WBC # FLD AUTO: 7.4 K/UL — SIGNIFICANT CHANGE UP (ref 3.8–10.5)

## 2018-05-01 PROCEDURE — 99213 OFFICE O/P EST LOW 20 MIN: CPT

## 2018-05-01 RX ORDER — ACETAMINOPHEN AND CODEINE 300; 30 MG/1; MG/1
300-30 TABLET ORAL 3 TIMES DAILY
Qty: 60 | Refills: 0 | Status: ACTIVE | COMMUNITY
Start: 2018-05-01 | End: 1900-01-01

## 2018-05-01 RX ORDER — OXYCODONE HYDROCHLORIDE 15 MG/1
15 TABLET ORAL
Qty: 75 | Refills: 0 | Status: DISCONTINUED | COMMUNITY
Start: 2018-04-30 | End: 2018-05-01

## 2018-05-01 RX ORDER — CAPECITABINE 500 MG/1
500 TABLET ORAL
Qty: 84 | Refills: 0 | Status: ACTIVE | COMMUNITY
Start: 2018-05-01 | End: 1900-01-01

## 2018-05-02 ENCOUNTER — EMERGENCY (EMERGENCY)
Facility: HOSPITAL | Age: 68
LOS: 1 days | Discharge: HOME | End: 2018-05-02
Attending: EMERGENCY MEDICINE
Payer: MEDICARE

## 2018-05-02 VITALS
RESPIRATION RATE: 18 BRPM | HEIGHT: 61 IN | HEART RATE: 116 BPM | SYSTOLIC BLOOD PRESSURE: 135 MMHG | DIASTOLIC BLOOD PRESSURE: 84 MMHG | TEMPERATURE: 99 F | OXYGEN SATURATION: 89 % | WEIGHT: 229.94 LBS

## 2018-05-02 VITALS
SYSTOLIC BLOOD PRESSURE: 147 MMHG | RESPIRATION RATE: 20 BRPM | OXYGEN SATURATION: 93 % | TEMPERATURE: 98 F | DIASTOLIC BLOOD PRESSURE: 90 MMHG | HEART RATE: 100 BPM

## 2018-05-02 DIAGNOSIS — Z98.890 OTHER SPECIFIED POSTPROCEDURAL STATES: Chronic | ICD-10-CM

## 2018-05-02 DIAGNOSIS — Z98.82 BREAST IMPLANT STATUS: Chronic | ICD-10-CM

## 2018-05-02 DIAGNOSIS — R11.2 NAUSEA WITH VOMITING, UNSPECIFIED: ICD-10-CM

## 2018-05-02 DIAGNOSIS — E86.0 DEHYDRATION: ICD-10-CM

## 2018-05-02 PROCEDURE — 71045 X-RAY EXAM CHEST 1 VIEW: CPT

## 2018-05-02 PROCEDURE — 99283 EMERGENCY DEPT VISIT LOW MDM: CPT

## 2018-05-02 PROCEDURE — 71045 X-RAY EXAM CHEST 1 VIEW: CPT | Mod: 26

## 2018-05-02 RX ORDER — OXYCODONE HYDROCHLORIDE 5 MG/1
10 TABLET ORAL ONCE
Qty: 0 | Refills: 0 | Status: DISCONTINUED | OUTPATIENT
Start: 2018-05-02 | End: 2018-05-02

## 2018-05-02 RX ORDER — OXYCODONE HYDROCHLORIDE 5 MG/1
2 TABLET ORAL
Qty: 64 | Refills: 0 | OUTPATIENT
Start: 2018-05-02 | End: 2018-05-09

## 2018-05-02 RX ORDER — FENTANYL CITRATE 50 UG/ML
1 INJECTION INTRAVENOUS
Qty: 0 | Refills: 0 | Status: COMPLETED | OUTPATIENT
Start: 2018-05-02 | End: 2018-05-09

## 2018-05-02 RX ADMIN — FENTANYL CITRATE 1 PATCH: 50 INJECTION INTRAVENOUS at 11:54

## 2018-05-02 RX ADMIN — OXYCODONE HYDROCHLORIDE 10 MILLIGRAM(S): 5 TABLET ORAL at 13:22

## 2018-05-02 RX ADMIN — OXYCODONE HYDROCHLORIDE 10 MILLIGRAM(S): 5 TABLET ORAL at 12:22

## 2018-05-02 NOTE — ED PROVIDER NOTE - NS_ ATTENDINGSCRIBEDETAILS _ED_A_ED_FT
I, Israel Guerrero, performed the initial face to face bedside interview with this patient regarding history of present illness, review of symptoms and relevant past medical, social and family history.  I completed an independent physical examination.    The history, relevant review of systems, past medical and surgical history, medical decision making, and physical examination was documented by the scribe in my presence and I attest to the accuracy of the documentation.

## 2018-05-02 NOTE — ED PROVIDER NOTE - PRINCIPAL DIAGNOSIS
Malignant neoplasm of male breast, unspecified estrogen receptor status, unspecified laterality, unspecified site of breast

## 2018-05-02 NOTE — ED PROVIDER NOTE - MEDICAL DECISION MAKING DETAILS
Will obtain CXR, medicate for pain and re-evaluate Will obtain CXR, medicate for pain and re-evaluate;

## 2018-05-02 NOTE — ED PROVIDER NOTE - PROGRESS NOTE DETAILS
spoke with Dr Yin of [facility], will change meds to oxycodone with 1 fentanyl patch and close follow up with Dr Kincaid of pain manangement

## 2018-05-02 NOTE — ED ADULT NURSE NOTE - CHIEF COMPLAINT QUOTE
Pt BIBA A&ox3 hx of triple neg breast CA, c/o upper and lower back pain. Pt reports pain is worse with movement, no pain at rest. Denies chest pain or SOB. Pt spo2 89% on RA, oxygen applied via nasal cannula at 2LPM. Denies any injuries or trauma.

## 2018-05-02 NOTE — ED PROVIDER NOTE - OBJECTIVE STATEMENT
69 y/o F with hx of breast CA with mets to the brain, bone and liver, HLD, HTN presents to ED c/o worsening pain of the left flank since this morning. She also notes pain to lower back, SOB and difficulty ambulating. Takes percocet daily, last dose was 7:00 am this morning. She had a PET scan yesterday, which showed mets to the bone. Denies fever, chills, CP and abdominal pain. No further complaints at this time.

## 2018-05-02 NOTE — ED PROVIDER NOTE - CARE PLAN
Principal Discharge DX:	Malignant neoplasm of male breast, unspecified estrogen receptor status, unspecified laterality, unspecified site of breast

## 2018-05-03 ENCOUNTER — APPOINTMENT (OUTPATIENT)
Dept: RADIATION ONCOLOGY | Facility: CLINIC | Age: 68
End: 2018-05-03

## 2018-05-03 ENCOUNTER — EMERGENCY (EMERGENCY)
Facility: HOSPITAL | Age: 68
LOS: 1 days | Discharge: HOME | End: 2018-05-03
Attending: EMERGENCY MEDICINE | Admitting: EMERGENCY MEDICINE
Payer: MEDICARE

## 2018-05-03 VITALS
OXYGEN SATURATION: 92 % | TEMPERATURE: 99 F | DIASTOLIC BLOOD PRESSURE: 67 MMHG | RESPIRATION RATE: 18 BRPM | SYSTOLIC BLOOD PRESSURE: 155 MMHG | HEART RATE: 81 BPM

## 2018-05-03 VITALS — HEIGHT: 61 IN | WEIGHT: 130.07 LBS

## 2018-05-03 DIAGNOSIS — Z98.890 OTHER SPECIFIED POSTPROCEDURAL STATES: Chronic | ICD-10-CM

## 2018-05-03 DIAGNOSIS — Z98.82 BREAST IMPLANT STATUS: Chronic | ICD-10-CM

## 2018-05-03 LAB
ALBUMIN SERPL ELPH-MCNC: 2.8 G/DL — LOW (ref 3.3–5.2)
ALP SERPL-CCNC: 179 U/L — HIGH (ref 40–120)
ALT FLD-CCNC: 52 U/L — HIGH
ANION GAP SERPL CALC-SCNC: 14 MMOL/L — SIGNIFICANT CHANGE UP (ref 5–17)
APTT BLD: 25 SEC — LOW (ref 27.5–37.4)
AST SERPL-CCNC: 54 U/L — HIGH
BASOPHILS # BLD AUTO: 0 K/UL — SIGNIFICANT CHANGE UP (ref 0–0.2)
BASOPHILS NFR BLD AUTO: 0 % — SIGNIFICANT CHANGE UP (ref 0–2)
BILIRUB SERPL-MCNC: 0.4 MG/DL — SIGNIFICANT CHANGE UP (ref 0.4–2)
BUN SERPL-MCNC: 28 MG/DL — HIGH (ref 8–20)
CALCIUM SERPL-MCNC: 9.6 MG/DL — SIGNIFICANT CHANGE UP (ref 8.6–10.2)
CHLORIDE SERPL-SCNC: 102 MMOL/L — SIGNIFICANT CHANGE UP (ref 98–107)
CK SERPL-CCNC: 137 U/L — SIGNIFICANT CHANGE UP (ref 25–170)
CO2 SERPL-SCNC: 23 MMOL/L — SIGNIFICANT CHANGE UP (ref 22–29)
CREAT SERPL-MCNC: 0.71 MG/DL — SIGNIFICANT CHANGE UP (ref 0.5–1.3)
D DIMER BLD IA.RAPID-MCNC: 5321 NG/ML DDU — HIGH
EOSINOPHIL # BLD AUTO: 0.1 K/UL — SIGNIFICANT CHANGE UP (ref 0–0.5)
EOSINOPHIL NFR BLD AUTO: 2 % — SIGNIFICANT CHANGE UP (ref 0–5)
GLUCOSE SERPL-MCNC: 108 MG/DL — SIGNIFICANT CHANGE UP (ref 70–115)
HCT VFR BLD CALC: 34.7 % — LOW (ref 37–47)
HGB BLD-MCNC: 10.6 G/DL — LOW (ref 12–16)
INR BLD: 1.16 RATIO — SIGNIFICANT CHANGE UP (ref 0.88–1.16)
LYMPHOCYTES # BLD AUTO: 0.8 K/UL — LOW (ref 1–4.8)
LYMPHOCYTES # BLD AUTO: 9 % — LOW (ref 20–55)
MCHC RBC-ENTMCNC: 24.4 PG — LOW (ref 27–31)
MCHC RBC-ENTMCNC: 30.5 G/DL — LOW (ref 32–36)
MCV RBC AUTO: 79.8 FL — LOW (ref 81–99)
MONOCYTES # BLD AUTO: 0.4 K/UL — SIGNIFICANT CHANGE UP (ref 0–0.8)
MONOCYTES NFR BLD AUTO: 2 % — LOW (ref 3–10)
NEUTROPHILS # BLD AUTO: 4 K/UL — SIGNIFICANT CHANGE UP (ref 1.8–8)
NEUTROPHILS NFR BLD AUTO: 81 % — HIGH (ref 37–73)
PLATELET # BLD AUTO: 156 K/UL — SIGNIFICANT CHANGE UP (ref 150–400)
POTASSIUM SERPL-MCNC: 4 MMOL/L — SIGNIFICANT CHANGE UP (ref 3.5–5.3)
POTASSIUM SERPL-SCNC: 4 MMOL/L — SIGNIFICANT CHANGE UP (ref 3.5–5.3)
PROT SERPL-MCNC: 5.7 G/DL — LOW (ref 6.6–8.7)
PROTHROM AB SERPL-ACNC: 12.8 SEC — HIGH (ref 9.8–12.7)
RBC # BLD: 4.35 M/UL — LOW (ref 4.4–5.2)
RBC # FLD: 19.8 % — HIGH (ref 11–15.6)
SODIUM SERPL-SCNC: 139 MMOL/L — SIGNIFICANT CHANGE UP (ref 135–145)
TROPONIN T SERPL-MCNC: <0.01 NG/ML — SIGNIFICANT CHANGE UP (ref 0–0.06)
WBC # BLD: 5.4 K/UL — SIGNIFICANT CHANGE UP (ref 4.8–10.8)
WBC # FLD AUTO: 5.4 K/UL — SIGNIFICANT CHANGE UP (ref 4.8–10.8)

## 2018-05-03 PROCEDURE — 80053 COMPREHEN METABOLIC PANEL: CPT

## 2018-05-03 PROCEDURE — 71045 X-RAY EXAM CHEST 1 VIEW: CPT | Mod: 26

## 2018-05-03 PROCEDURE — 99285 EMERGENCY DEPT VISIT HI MDM: CPT

## 2018-05-03 PROCEDURE — 71275 CT ANGIOGRAPHY CHEST: CPT | Mod: 26

## 2018-05-03 PROCEDURE — 36415 COLL VENOUS BLD VENIPUNCTURE: CPT

## 2018-05-03 PROCEDURE — 71045 X-RAY EXAM CHEST 1 VIEW: CPT

## 2018-05-03 PROCEDURE — 71275 CT ANGIOGRAPHY CHEST: CPT

## 2018-05-03 PROCEDURE — 85730 THROMBOPLASTIN TIME PARTIAL: CPT

## 2018-05-03 PROCEDURE — 85610 PROTHROMBIN TIME: CPT

## 2018-05-03 PROCEDURE — 85027 COMPLETE CBC AUTOMATED: CPT

## 2018-05-03 PROCEDURE — 70450 CT HEAD/BRAIN W/O DYE: CPT

## 2018-05-03 PROCEDURE — 93005 ELECTROCARDIOGRAM TRACING: CPT

## 2018-05-03 PROCEDURE — 70450 CT HEAD/BRAIN W/O DYE: CPT | Mod: 26

## 2018-05-03 PROCEDURE — 93010 ELECTROCARDIOGRAM REPORT: CPT

## 2018-05-03 PROCEDURE — 84484 ASSAY OF TROPONIN QUANT: CPT

## 2018-05-03 PROCEDURE — 99284 EMERGENCY DEPT VISIT MOD MDM: CPT | Mod: 25

## 2018-05-03 PROCEDURE — 82550 ASSAY OF CK (CPK): CPT

## 2018-05-03 PROCEDURE — 85379 FIBRIN DEGRADATION QUANT: CPT

## 2018-05-03 RX ORDER — SODIUM CHLORIDE 9 MG/ML
3 INJECTION INTRAMUSCULAR; INTRAVENOUS; SUBCUTANEOUS ONCE
Qty: 0 | Refills: 0 | Status: COMPLETED | OUTPATIENT
Start: 2018-05-03 | End: 2018-05-03

## 2018-05-03 RX ORDER — SIMVASTATIN 20 MG/1
1 TABLET, FILM COATED ORAL
Qty: 0 | Refills: 0 | COMMUNITY

## 2018-05-03 RX ORDER — METOPROLOL TARTRATE 50 MG
1 TABLET ORAL
Qty: 0 | Refills: 0 | COMMUNITY

## 2018-05-03 RX ORDER — CHOLECALCIFEROL (VITAMIN D3) 125 MCG
1 CAPSULE ORAL
Qty: 0 | Refills: 0 | COMMUNITY

## 2018-05-03 RX ORDER — AMLODIPINE BESYLATE 2.5 MG/1
1 TABLET ORAL
Qty: 0 | Refills: 0 | COMMUNITY

## 2018-05-03 RX ORDER — ASPIRIN/CALCIUM CARB/MAGNESIUM 324 MG
1 TABLET ORAL
Qty: 0 | Refills: 0 | COMMUNITY

## 2018-05-03 RX ADMIN — SODIUM CHLORIDE 3 MILLILITER(S): 9 INJECTION INTRAMUSCULAR; INTRAVENOUS; SUBCUTANEOUS at 16:28

## 2018-05-03 NOTE — ED PROVIDER NOTE - PROGRESS NOTE DETAILS
spoke extensively with patient and family; patient is weak and sob and hypoxic, but patient remains aaox3 and verbalizes understanding of her disease processes and prognosis;   patient does not want to stay in hospital; discussed hospice which patient refuses at this time  patient wants to be discharged  will return if worse

## 2018-05-03 NOTE — ED ADULT NURSE REASSESSMENT NOTE - NS ED NURSE REASSESS COMMENT FT1
pt sitting calm in bed. a and o x3. breathing even and unlabored on 3l via nc. nsr on cm. pt has no complaints at this time. daughter at bedside. informed of current plan of care. awaiting ct results. will continue to monitor.

## 2018-05-03 NOTE — ED PROVIDER NOTE - MEDICAL DECISION MAKING DETAILS
Hx of metastatic CA and SOB and generalized weakness. Will obtain labs and ct to r/o PE and re-evaluate.

## 2018-05-03 NOTE — ED PROVIDER NOTE - OBJECTIVE STATEMENT
69 y/o F pt with hx of HTN, CA, and HLD present to ED BIBA c/o CP and SOB. SOB associated exertion and anxiety and is fairly constant. Pt reports generalized weakness and mild abdominal pain. Prescribed oxytocin 15 mg and pt responded poorly. Pain has been progressively worse since Friday. Pt is stage 4 metastatic in brain, bone, liver, and breast. No further complaints at this time. 67 y/o F pt with hx of HTN, stage 4 metastatic CA in brain, bone, liver, and breast, and HLD present to ED BIBA c/o CP and SOB. SOB associated exertion and anxiety and is fairly constant. Pt reports generalized weakness and mild abdominal pain. Prescribed oxytocin 15 mg and pt responded poorly. Pain has been progressively worse since Friday. No further complaints at this time. 67 y/o F pt with hx of HTN, stage 4 metastatic CA in brain, bone, liver, and breast, and HLD present to ED BIBA c/o CP and SOB. SOB associated exertion and anxiety and is fairly constant. Pt reports generalized weakness and mild abdominal pain. Prescribed oxytocin 15 mg and pt responded poorly. Pain has been progressively worse since Friday. She was in the hospital yesterday and had a CXR that came back normal and went home. No further complaints at this time.

## 2018-05-03 NOTE — ED PROVIDER NOTE - CARDIAC, MLM
Normal rate, regular rhythm.  Heart sounds S1, S2.  No murmurs, rubs or gallops. port to R upper chest

## 2018-05-03 NOTE — ED ADULT NURSE NOTE - CHPI ED SYMPTOMS NEG
no chest pain/no headache/no diaphoresis/no cough/no chills/no hemoptysis/no wheezing/no body aches/no edema/no fever

## 2018-05-03 NOTE — ED ADULT NURSE NOTE - OBJECTIVE STATEMENT
pt reports sob x 2 days. worse today. relieved with 3l nc placed in triage. pt has no other complaints at this time. pt has stage 4 breast cancer, was here yesterday as well (yesterday visit was for pain management as per daughter). pt sitting calm in bed. a and o x3. breathing even and unlabored. nsr on cm. no le edema. + and = peripheral pulses. no le edema noted. will continue to monitor.

## 2018-05-03 NOTE — ED PROVIDER NOTE - MUSCULOSKELETAL, MLM
Tenderness to left femur and limited ROM, good strength in right leg Tenderness to left femur and limited ROM, good strength in right leg, port to right upper chest

## 2018-05-17 ENCOUNTER — APPOINTMENT (OUTPATIENT)
Dept: HEMATOLOGY ONCOLOGY | Facility: CLINIC | Age: 68
End: 2018-05-17

## 2018-06-06 ENCOUNTER — OTHER (OUTPATIENT)
Age: 68
End: 2018-06-06

## 2018-06-19 ENCOUNTER — APPOINTMENT (OUTPATIENT)
Dept: DERMATOLOGY | Facility: CLINIC | Age: 68
End: 2018-06-19

## 2018-06-20 ENCOUNTER — APPOINTMENT (OUTPATIENT)
Dept: NEUROLOGY | Facility: CLINIC | Age: 68
End: 2018-06-20

## 2019-11-08 NOTE — ED PROVIDER NOTE - CPE EDP SKIN NORM
What Type Of Note Output Would You Prefer (Optional)?: Standard Output Hpi Title: Evaluation of a Skin Lesion How Severe Are Your Spot(S)?: mild Have Your Spot(S) Been Treated In The Past?: has not been treated normal...

## 2020-01-24 NOTE — PATIENT PROFILE ADULT. - DOES PATIENT HAVE ADVANCE DIRECTIVE
- Continue regular diet.  - Increase ambulation.  - Continue current pain control.  - F/u AM ROSANA Gramajo, PGY-1 Yes

## 2020-04-24 NOTE — ED ADULT NURSE NOTE - BREATH SOUNDS, MLM
Created telephone encounter. Per Pt HIPAA from can leave results on machine. LMOM relaying message per physician. Pt to call the office with any concerns. Clear

## 2020-05-11 NOTE — PATIENT PROFILE ADULT. - ATTEMPT TO OOB
Detail Level: Detailed Quality 431: Preventive Care And Screening: Unhealthy Alcohol Use - Screening: Patient screened for unhealthy alcohol use using a single question and scores less than 2 times per year Quality 402: Tobacco Use And Help With Quitting Among Adolescents: Patient screened for tobacco and never smoked no

## 2020-06-11 NOTE — ED ADULT NURSE NOTE - NSSISCREENINGQ2_ED_A_ED
No Thalidomide Counseling: I discussed with the patient the risks of thalidomide including but not limited to birth defects, anxiety, weakness, chest pain, dizziness, cough and severe allergy.

## 2022-10-18 NOTE — ED ADULT TRIAGE NOTE - BP NONINVASIVE DIASTOLIC (MM HG)
64 Humira Pregnancy And Lactation Text: This medication is Pregnancy Category B and is considered safe during pregnancy. It is unknown if this medication is excreted in breast milk.

## 2023-06-15 NOTE — ED ADULT TRIAGE NOTE - CHIEF COMPLAINT QUOTE
Called pt parent/guardian and lvm to cb for potential therapy services with Jaimie Ferrell 
Pt BIBA A&ox3 hx of triple neg breast CA, c/o upper and lower back pain. Pt reports pain is worse with movement, no pain at rest. Denies chest pain or SOB. Pt spo2 89% on RA, oxygen applied via nasal cannula at 2LPM. Denies any injuries or trauma.
Initial (On Arrival)

## 2023-07-28 NOTE — CONSULT NOTE ADULT - PROBLEM SELECTOR RECOMMENDATION 9
R ear cerumen removed today. TM appears normal. Advised consultation with ENT if symptoms fail to improve.
Secondary to brain metastases   Patient took herself off Keppra 2 weeks ago secondary to not having had a seizure prior.   Patient given Keppra and decadron in ED.   Suggest continuing seizure ppx.
- advanced with brain mets.   - just received radiation a few weeks ago ( 2wk course).

## 2023-12-30 NOTE — ED ADULT NURSE REASSESSMENT NOTE - NIH STROKE SCALE: 8. SENSORY, QM
[facility] EMERGENCY DEPT  EMERGENCY DEPARTMENT ENCOUNTER      Pt Name: Becca Adair  MRN: [id number]  [address] [facility] [place] 1991  Date of evaluation: 2023  Provider: Augustus Melenrdez MD    CHIEF COMPLAINT       Chief Complaint   Patient presents with    Leg Pain       HISTORY OF PRESENT ILLNESS    HPI    26-year-old female presenting for right knee pain. Patient reports 2 days of knee pain worse with movement and ambulation. Denies injury, twisting, falls. Was seen in the ER yesterday and had lab work as well as a DVT ultrasound all of which were reassuring. She was discharged home, states she has not been taking any medications for it. She woke up this morning and the pain continued. She denies fevers, chills. No numbness, tingling or weakness. The leg does not appear swollen to her. There is no redness, warmth or drainage. She does have a history of arthritis in that knee. States she has had to stand for long periods of time at work recently  Nursing notes reviewed. REVIEW OF SYSTEMS     Review of Systems  Unless otherwise stated, a complete review of systems was asked of the patient. Pertinent positives are noted in the HPI section.     PAST MEDICAL HISTORY     Past Medical History:   Diagnosis Date    Chlamydia 2019    per pt    COVID-19 2021    recovered at home    Diabetes mellitus ([address]) 2012    Type 2 diabetes mellitus without complication, unspecified whether long term insulin use ([address])    Hypercholesterolemia     Migraine     Mild depression 2012    Pap smear abnormality of cervix/human papillomavirus (HPV) positive 2020, 2011    Postpartum depression 2012    Preeclampsia     Thrombocytosis 3/1/2019    Trichomonas infection 2020    Vitamin D deficiency        SURGICAL HISTORY       Past Surgical History:   Procedure Laterality Date     DELIVERY ONLY      c/s for [facility]      SECTION  ;2016    x2       CURRENT MEDICATIONS (0) Normal; no sensory loss